# Patient Record
Sex: FEMALE | Race: WHITE | Employment: FULL TIME | URBAN - METROPOLITAN AREA
[De-identification: names, ages, dates, MRNs, and addresses within clinical notes are randomized per-mention and may not be internally consistent; named-entity substitution may affect disease eponyms.]

---

## 2017-01-03 ENCOUNTER — HOSPITAL ENCOUNTER (OUTPATIENT)
Dept: MRI IMAGING | Age: 57
Discharge: HOME | End: 2017-01-03
Payer: COMMERCIAL

## 2017-01-03 DIAGNOSIS — S83.91XA SPRAIN OF RIGHT KNEE: ICD-10-CM

## 2017-01-03 PROCEDURE — 73721 MRI JNT OF LWR EXTRE W/O DYE: CPT

## 2017-02-28 ENCOUNTER — HOSPITAL ENCOUNTER (OUTPATIENT)
Dept: NON INVASIVE DIAGNOSTICS | Age: 57
Discharge: HOME | End: 2017-02-28
Payer: COMMERCIAL

## 2017-02-28 DIAGNOSIS — Z01.818 PRE-OP EXAM: ICD-10-CM

## 2017-02-28 LAB
ATRIAL RATE: 78 BPM
CALCULATED P AXIS, ECG09: -2 DEGREES
CALCULATED R AXIS, ECG10: 39 DEGREES
CALCULATED T AXIS, ECG11: 36 DEGREES
DIAGNOSIS, 93000: NORMAL
P-R INTERVAL, ECG05: 158 MS
Q-T INTERVAL, ECG07: 378 MS
QRS DURATION, ECG06: 88 MS
QTC CALCULATION (BEZET), ECG08: 430 MS
VENTRICULAR RATE, ECG03: 78 BPM

## 2017-02-28 PROCEDURE — 93005 ELECTROCARDIOGRAM TRACING: CPT

## 2017-10-16 ENCOUNTER — HOSPITAL ENCOUNTER (OUTPATIENT)
Dept: PREADMISSION TESTING | Age: 57
Discharge: HOME | End: 2017-10-16

## 2017-10-16 ENCOUNTER — HOSPITAL ENCOUNTER (OUTPATIENT)
Dept: PHYSICAL THERAPY | Age: 57
Discharge: HOME | End: 2017-10-16

## 2017-10-16 VITALS
TEMPERATURE: 97.9 F | SYSTOLIC BLOOD PRESSURE: 138 MMHG | DIASTOLIC BLOOD PRESSURE: 72 MMHG | HEART RATE: 84 BPM | BODY MASS INDEX: 39.76 KG/M2 | HEIGHT: 66 IN | OXYGEN SATURATION: 98 % | RESPIRATION RATE: 18 BRPM | WEIGHT: 247.4 LBS

## 2017-10-16 RX ORDER — ROSUVASTATIN CALCIUM 20 MG/1
20 TABLET, COATED ORAL
COMMUNITY
End: 2022-03-17

## 2017-10-16 RX ORDER — LOSARTAN POTASSIUM 50 MG/1
50 TABLET ORAL
COMMUNITY
End: 2022-03-17

## 2017-10-16 NOTE — ADVANCED PRACTICE NURSE
BMI of 40 in PAT assessment. PC to [facility] at Dr. Hipolito Rivera' office who advises to no proceed with PAT assessment due to BMI. States pt was due to come in for weight check last week but did not show for appt. Pt advised per [facility] that we were unable to proceed with appt due to BMI. Advised pt to contact Dr. Hipolito Rivera' office regarding further instructions for surgery. Pt states she will do so.

## 2017-11-13 ENCOUNTER — HOSPITAL ENCOUNTER (OUTPATIENT)
Dept: PREADMISSION TESTING | Age: 57
Discharge: HOME | End: 2017-11-13
Payer: COMMERCIAL

## 2017-11-13 VITALS
TEMPERATURE: 97.9 F | HEIGHT: 66 IN | WEIGHT: 248 LBS | BODY MASS INDEX: 39.86 KG/M2 | HEART RATE: 87 BPM | SYSTOLIC BLOOD PRESSURE: 160 MMHG | DIASTOLIC BLOOD PRESSURE: 89 MMHG

## 2017-11-13 LAB
ABO + RH BLD: NORMAL
ANION GAP SERPL CALC-SCNC: 6 MMOL/L (ref 5–15)
APPEARANCE UR: ABNORMAL
ATRIAL RATE: 75 BPM
BACTERIA URNS QL MICRO: NEGATIVE /HPF
BILIRUB UR QL: NEGATIVE
BLOOD GROUP ANTIBODIES SERPL: NORMAL
BUN SERPL-MCNC: 13 MG/DL (ref 6–20)
BUN/CREAT SERPL: 21 (ref 12–20)
CALCIUM SERPL-MCNC: 9.2 MG/DL (ref 8.5–10.1)
CALCULATED P AXIS, ECG09: -12 DEGREES
CALCULATED R AXIS, ECG10: 28 DEGREES
CALCULATED T AXIS, ECG11: 26 DEGREES
CHLORIDE SERPL-SCNC: 103 MMOL/L (ref 97–108)
CO2 SERPL-SCNC: 31 MMOL/L (ref 21–32)
COLOR UR: ABNORMAL
CREAT SERPL-MCNC: 0.63 MG/DL (ref 0.55–1.02)
DIAGNOSIS, 93000: NORMAL
EPITH CASTS URNS QL MICRO: ABNORMAL /LPF
ERYTHROCYTE [DISTWIDTH] IN BLOOD BY AUTOMATED COUNT: 13.4 % (ref 11.5–14.5)
EST. AVERAGE GLUCOSE BLD GHB EST-MCNC: 97 MG/DL
GLUCOSE SERPL-MCNC: 97 MG/DL (ref 65–100)
GLUCOSE UR STRIP.AUTO-MCNC: NEGATIVE MG/DL
HBA1C MFR BLD: 5 % (ref 4.2–6.3)
HCT VFR BLD AUTO: 38.6 % (ref 35–47)
HGB BLD-MCNC: 12.7 G/DL (ref 11.5–16)
HGB UR QL STRIP: NEGATIVE
HYALINE CASTS URNS QL MICRO: ABNORMAL /LPF (ref 0–5)
INR PPP: 1 (ref 0.9–1.1)
KETONES UR QL STRIP.AUTO: NEGATIVE MG/DL
LEUKOCYTE ESTERASE UR QL STRIP.AUTO: ABNORMAL
MCH RBC QN AUTO: 28.1 PG (ref 26–34)
MCHC RBC AUTO-ENTMCNC: 32.9 G/DL (ref 30–36.5)
MCV RBC AUTO: 85.4 FL (ref 80–99)
NITRITE UR QL STRIP.AUTO: NEGATIVE
P-R INTERVAL, ECG05: 168 MS
PH UR STRIP: 6 [PH] (ref 5–8)
PLATELET # BLD AUTO: 154 K/UL (ref 150–400)
POTASSIUM SERPL-SCNC: 4.1 MMOL/L (ref 3.5–5.1)
PROT UR STRIP-MCNC: NEGATIVE MG/DL
PROTHROMBIN TIME: 10.4 SEC (ref 9–11.1)
Q-T INTERVAL, ECG07: 380 MS
QRS DURATION, ECG06: 90 MS
QTC CALCULATION (BEZET), ECG08: 424 MS
RBC # BLD AUTO: 4.52 M/UL (ref 3.8–5.2)
RBC #/AREA URNS HPF: ABNORMAL /HPF (ref 0–5)
SODIUM SERPL-SCNC: 140 MMOL/L (ref 136–145)
SP GR UR REFRACTOMETRY: 1.02 (ref 1–1.03)
SPECIMEN EXP DATE BLD: NORMAL
UA: UC IF INDICATED,UAUC: ABNORMAL
UROBILINOGEN UR QL STRIP.AUTO: 0.2 EU/DL (ref 0.2–1)
VENTRICULAR RATE, ECG03: 75 BPM
WBC # BLD AUTO: 4 K/UL (ref 3.6–11)
WBC URNS QL MICRO: ABNORMAL /HPF (ref 0–4)

## 2017-11-13 PROCEDURE — 83036 HEMOGLOBIN GLYCOSYLATED A1C: CPT | Performed by: ORTHOPAEDIC SURGERY

## 2017-11-13 PROCEDURE — 81001 URINALYSIS AUTO W/SCOPE: CPT | Performed by: ORTHOPAEDIC SURGERY

## 2017-11-13 PROCEDURE — 87086 URINE CULTURE/COLONY COUNT: CPT | Performed by: ORTHOPAEDIC SURGERY

## 2017-11-13 PROCEDURE — 85610 PROTHROMBIN TIME: CPT | Performed by: ORTHOPAEDIC SURGERY

## 2017-11-13 PROCEDURE — 80048 BASIC METABOLIC PNL TOTAL CA: CPT | Performed by: ORTHOPAEDIC SURGERY

## 2017-11-13 PROCEDURE — 86900 BLOOD TYPING SEROLOGIC ABO: CPT | Performed by: ORTHOPAEDIC SURGERY

## 2017-11-13 PROCEDURE — 93005 ELECTROCARDIOGRAM TRACING: CPT

## 2017-11-13 PROCEDURE — 85027 COMPLETE CBC AUTOMATED: CPT | Performed by: ORTHOPAEDIC SURGERY

## 2017-11-13 RX ORDER — ACETAMINOPHEN 500 MG
1000 TABLET ORAL
COMMUNITY
End: 2017-11-22

## 2017-11-13 RX ORDER — IBUPROFEN 200 MG
400 TABLET ORAL AS NEEDED
COMMUNITY
End: 2017-11-22

## 2017-11-13 NOTE — PERIOP NOTES
PREOPERATIVE INSTRUCTIONS REVIEWED WITH PATIENT. PATIENT GIVEN SIX PACKS OF CHG WIPES. INSTRUCTIONS (TO BE REVIEWED IN CLASS) ON USE OF CHG WIPES. PATIENT GIVEN SSI INFECTION SHEET. MRSA/MMSA TREATMENT INSTRUCTION SHEET GIVEN WITH AN EXPLANATION TO PATIENT THAT THEY WILL BE NOTIFIED IF TREATMENT INSTRUCTIONS NEED TO BE INITIATED. PATIENT WAS GIVEN THE OPPORTUNITY TO ASK QUESTIONS ON THE INFORMATION PROVIDED.

## 2017-11-14 LAB
BACTERIA SPEC CULT: NORMAL
CC UR VC: NORMAL
SERVICE CMNT-IMP: NORMAL
SERVICE CMNT-IMP: NORMAL

## 2017-11-17 RX ORDER — ACETAMINOPHEN 500 MG
1000 TABLET ORAL ONCE
Status: CANCELLED | OUTPATIENT
Start: 2017-11-17 | End: 2017-11-17

## 2017-11-17 RX ORDER — CEFAZOLIN SODIUM/WATER 2 G/20 ML
2 SYRINGE (ML) INTRAVENOUS ONCE
Status: CANCELLED | OUTPATIENT
Start: 2017-11-17 | End: 2017-11-17

## 2017-11-17 RX ORDER — PREGABALIN 75 MG/1
75 CAPSULE ORAL ONCE
Status: CANCELLED | OUTPATIENT
Start: 2017-11-17 | End: 2017-11-17

## 2017-11-17 RX ORDER — CELECOXIB 200 MG/1
200 CAPSULE ORAL ONCE
Status: CANCELLED | OUTPATIENT
Start: 2017-11-17 | End: 2017-11-17

## 2017-11-17 RX ORDER — DEXAMETHASONE SODIUM PHOSPHATE 100 MG/10ML
10 INJECTION INTRAMUSCULAR; INTRAVENOUS ONCE
Status: CANCELLED | OUTPATIENT
Start: 2017-11-17 | End: 2017-11-17

## 2017-11-20 ENCOUNTER — ANESTHESIA EVENT (OUTPATIENT)
Dept: SURGERY | Age: 57
DRG: 470 | End: 2017-11-20
Payer: COMMERCIAL

## 2017-11-21 ENCOUNTER — HOSPITAL ENCOUNTER (INPATIENT)
Age: 57
LOS: 1 days | Discharge: HOME HEALTH | DRG: 470 | End: 2017-11-22
Attending: ORTHOPAEDIC SURGERY | Admitting: ORTHOPAEDIC SURGERY
Payer: COMMERCIAL

## 2017-11-21 ENCOUNTER — ANESTHESIA (OUTPATIENT)
Dept: SURGERY | Age: 57
DRG: 470 | End: 2017-11-21
Payer: COMMERCIAL

## 2017-11-21 PROBLEM — M17.11 PRIMARY LOCALIZED OSTEOARTHRITIS OF RIGHT KNEE: Status: ACTIVE | Noted: 2017-11-21

## 2017-11-21 LAB
GLUCOSE BLD STRIP.AUTO-MCNC: 93 MG/DL (ref 65–100)
SERVICE CMNT-IMP: NORMAL

## 2017-11-21 PROCEDURE — C1713 ANCHOR/SCREW BN/BN,TIS/BN: HCPCS | Performed by: ORTHOPAEDIC SURGERY

## 2017-11-21 PROCEDURE — 65270000029 HC RM PRIVATE

## 2017-11-21 PROCEDURE — 77030033067 HC SUT PDO STRATFX SPIR J&J -B: Performed by: ORTHOPAEDIC SURGERY

## 2017-11-21 PROCEDURE — 77030003601 HC NDL NRV BLK BBMI -A

## 2017-11-21 PROCEDURE — 76210000016 HC OR PH I REC 1 TO 1.5 HR: Performed by: ORTHOPAEDIC SURGERY

## 2017-11-21 PROCEDURE — C1776 JOINT DEVICE (IMPLANTABLE): HCPCS | Performed by: ORTHOPAEDIC SURGERY

## 2017-11-21 PROCEDURE — 74011250636 HC RX REV CODE- 250/636

## 2017-11-21 PROCEDURE — 74011250636 HC RX REV CODE- 250/636: Performed by: ANESTHESIOLOGY

## 2017-11-21 PROCEDURE — 74011250636 HC RX REV CODE- 250/636: Performed by: ORTHOPAEDIC SURGERY

## 2017-11-21 PROCEDURE — 0SRC0J9 REPLACEMENT OF RIGHT KNEE JOINT WITH SYNTHETIC SUBSTITUTE, CEMENTED, OPEN APPROACH: ICD-10-PCS | Performed by: ORTHOPAEDIC SURGERY

## 2017-11-21 PROCEDURE — 77030018836 HC SOL IRR NACL ICUM -A: Performed by: ORTHOPAEDIC SURGERY

## 2017-11-21 PROCEDURE — 77030005515 HC CATH URETH FOL14 BARD -B: Performed by: ORTHOPAEDIC SURGERY

## 2017-11-21 PROCEDURE — 77030012935 HC DRSG AQUACEL BMS -B: Performed by: ORTHOPAEDIC SURGERY

## 2017-11-21 PROCEDURE — 77030011640 HC PAD GRND REM COVD -A: Performed by: ORTHOPAEDIC SURGERY

## 2017-11-21 PROCEDURE — 77030000032 HC CUF TRNQT ZIMM -B: Performed by: ORTHOPAEDIC SURGERY

## 2017-11-21 PROCEDURE — 74011000250 HC RX REV CODE- 250: Performed by: ORTHOPAEDIC SURGERY

## 2017-11-21 PROCEDURE — 76010000172 HC OR TIME 2.5 TO 3 HR INTENSV-TIER 1: Performed by: ORTHOPAEDIC SURGERY

## 2017-11-21 PROCEDURE — 77030020788: Performed by: ORTHOPAEDIC SURGERY

## 2017-11-21 PROCEDURE — 82962 GLUCOSE BLOOD TEST: CPT

## 2017-11-21 PROCEDURE — 3E0T3BZ INTRODUCTION OF ANESTHETIC AGENT INTO PERIPHERAL NERVES AND PLEXI, PERCUTANEOUS APPROACH: ICD-10-PCS | Performed by: ANESTHESIOLOGY

## 2017-11-21 PROCEDURE — 74011000250 HC RX REV CODE- 250

## 2017-11-21 PROCEDURE — 74011250637 HC RX REV CODE- 250/637: Performed by: ORTHOPAEDIC SURGERY

## 2017-11-21 PROCEDURE — 77030007866 HC KT SPN ANES BBMI -B: Performed by: ANESTHESIOLOGY

## 2017-11-21 PROCEDURE — 77030014077 HC TOWER MX CEM J&J -C: Performed by: ORTHOPAEDIC SURGERY

## 2017-11-21 PROCEDURE — C9290 INJ, BUPIVACAINE LIPOSOME: HCPCS | Performed by: ORTHOPAEDIC SURGERY

## 2017-11-21 PROCEDURE — 74011000258 HC RX REV CODE- 258: Performed by: ORTHOPAEDIC SURGERY

## 2017-11-21 PROCEDURE — 77030027138 HC INCENT SPIROMETER -A

## 2017-11-21 PROCEDURE — 74011000258 HC RX REV CODE- 258

## 2017-11-21 PROCEDURE — 77030018846 HC SOL IRR STRL H20 ICUM -A: Performed by: ORTHOPAEDIC SURGERY

## 2017-11-21 PROCEDURE — 77030016547 HC BLD SAW SAG1 STRY -B: Performed by: ORTHOPAEDIC SURGERY

## 2017-11-21 PROCEDURE — 77030020782 HC GWN BAIR PAWS FLX 3M -B

## 2017-11-21 PROCEDURE — 77030002933 HC SUT MCRYL J&J -A: Performed by: ORTHOPAEDIC SURGERY

## 2017-11-21 PROCEDURE — 77030031139 HC SUT VCRL2 J&J -A: Performed by: ORTHOPAEDIC SURGERY

## 2017-11-21 PROCEDURE — 77030010507 HC ADH SKN DERMBND J&J -B: Performed by: ORTHOPAEDIC SURGERY

## 2017-11-21 PROCEDURE — 76060000036 HC ANESTHESIA 2.5 TO 3 HR: Performed by: ORTHOPAEDIC SURGERY

## 2017-11-21 PROCEDURE — 77030018822 HC SLV COMPR FT COVD -B

## 2017-11-21 PROCEDURE — 64450 NJX AA&/STRD OTHER PN/BRANCH: CPT

## 2017-11-21 PROCEDURE — 77030020365 HC SOL INJ SOD CL 0.9% 50ML: Performed by: ORTHOPAEDIC SURGERY

## 2017-11-21 DEVICE — KIT BNE CEM PREP PLUG BRSH CURET SPNG W/ RESTRIC FOR FEM: Type: IMPLANTABLE DEVICE | Site: KNEE | Status: FUNCTIONAL

## 2017-11-21 DEVICE — COMPONENT FEM SZ 7 R KNEE POST STBL CEM ATTUNE: Type: IMPLANTABLE DEVICE | Site: KNEE | Status: FUNCTIONAL

## 2017-11-21 DEVICE — INSERT TIB SZ 7 THK7MM KNEE POST STBL FIX BEAR ATTUNE: Type: IMPLANTABLE DEVICE | Site: KNEE | Status: FUNCTIONAL

## 2017-11-21 DEVICE — COMPONENT PAT DIA38MM POLYETH DOME CEM MEDIALIZED ATTUNE: Type: IMPLANTABLE DEVICE | Site: KNEE | Status: FUNCTIONAL

## 2017-11-21 DEVICE — CEMENT BNE GENTAMICIN 40 GM SMARTSET GMV: Type: IMPLANTABLE DEVICE | Site: KNEE | Status: FUNCTIONAL

## 2017-11-21 RX ORDER — MIDAZOLAM HYDROCHLORIDE 1 MG/ML
1 INJECTION, SOLUTION INTRAMUSCULAR; INTRAVENOUS AS NEEDED
Status: DISCONTINUED | OUTPATIENT
Start: 2017-11-21 | End: 2017-11-21 | Stop reason: HOSPADM

## 2017-11-21 RX ORDER — PAROXETINE HYDROCHLORIDE 20 MG/1
20 TABLET, FILM COATED ORAL
Status: DISCONTINUED | OUTPATIENT
Start: 2017-11-21 | End: 2017-11-22 | Stop reason: HOSPADM

## 2017-11-21 RX ORDER — ACETAMINOPHEN 500 MG
500 TABLET ORAL
Status: DISCONTINUED | OUTPATIENT
Start: 2017-11-21 | End: 2017-11-22 | Stop reason: HOSPADM

## 2017-11-21 RX ORDER — FACIAL-BODY WIPES
10 EACH TOPICAL DAILY PRN
Status: DISCONTINUED | OUTPATIENT
Start: 2017-11-23 | End: 2017-11-22 | Stop reason: HOSPADM

## 2017-11-21 RX ORDER — SODIUM CHLORIDE 9 MG/ML
25 INJECTION, SOLUTION INTRAVENOUS CONTINUOUS
Status: DISCONTINUED | OUTPATIENT
Start: 2017-11-21 | End: 2017-11-21 | Stop reason: HOSPADM

## 2017-11-21 RX ORDER — HYDROMORPHONE HYDROCHLORIDE 2 MG/ML
0.5 INJECTION, SOLUTION INTRAMUSCULAR; INTRAVENOUS; SUBCUTANEOUS
Status: ACTIVE | OUTPATIENT
Start: 2017-11-21 | End: 2017-11-22

## 2017-11-21 RX ORDER — BUPIVACAINE HYDROCHLORIDE 7.5 MG/ML
INJECTION, SOLUTION EPIDURAL; RETROBULBAR AS NEEDED
Status: DISCONTINUED | OUTPATIENT
Start: 2017-11-21 | End: 2017-11-21 | Stop reason: HOSPADM

## 2017-11-21 RX ORDER — PREGABALIN 75 MG/1
75 CAPSULE ORAL ONCE
Status: COMPLETED | OUTPATIENT
Start: 2017-11-21 | End: 2017-11-21

## 2017-11-21 RX ORDER — HYDROMORPHONE HYDROCHLORIDE 1 MG/ML
0.2 INJECTION, SOLUTION INTRAMUSCULAR; INTRAVENOUS; SUBCUTANEOUS
Status: DISCONTINUED | OUTPATIENT
Start: 2017-11-21 | End: 2017-11-21 | Stop reason: HOSPADM

## 2017-11-21 RX ORDER — WARFARIN 7.5 MG/1
7.5 TABLET ORAL ONCE
Status: COMPLETED | OUTPATIENT
Start: 2017-11-21 | End: 2017-11-21

## 2017-11-21 RX ORDER — PROPOFOL 10 MG/ML
INJECTION, EMULSION INTRAVENOUS
Status: DISCONTINUED | OUTPATIENT
Start: 2017-11-21 | End: 2017-11-21 | Stop reason: HOSPADM

## 2017-11-21 RX ORDER — FENTANYL CITRATE 50 UG/ML
50 INJECTION, SOLUTION INTRAMUSCULAR; INTRAVENOUS AS NEEDED
Status: DISCONTINUED | OUTPATIENT
Start: 2017-11-21 | End: 2017-11-21 | Stop reason: HOSPADM

## 2017-11-21 RX ORDER — FENTANYL CITRATE 50 UG/ML
25 INJECTION, SOLUTION INTRAMUSCULAR; INTRAVENOUS
Status: DISCONTINUED | OUTPATIENT
Start: 2017-11-21 | End: 2017-11-21 | Stop reason: HOSPADM

## 2017-11-21 RX ORDER — HYDROXYZINE HYDROCHLORIDE 10 MG/1
10 TABLET, FILM COATED ORAL
Status: DISCONTINUED | OUTPATIENT
Start: 2017-11-21 | End: 2017-11-22 | Stop reason: HOSPADM

## 2017-11-21 RX ORDER — MIDAZOLAM HYDROCHLORIDE 1 MG/ML
0.5 INJECTION, SOLUTION INTRAMUSCULAR; INTRAVENOUS
Status: DISCONTINUED | OUTPATIENT
Start: 2017-11-21 | End: 2017-11-21 | Stop reason: HOSPADM

## 2017-11-21 RX ORDER — DEXAMETHASONE SODIUM PHOSPHATE 4 MG/ML
INJECTION, SOLUTION INTRA-ARTICULAR; INTRALESIONAL; INTRAMUSCULAR; INTRAVENOUS; SOFT TISSUE AS NEEDED
Status: DISCONTINUED | OUTPATIENT
Start: 2017-11-21 | End: 2017-11-21 | Stop reason: HOSPADM

## 2017-11-21 RX ORDER — ONDANSETRON 2 MG/ML
4 INJECTION INTRAMUSCULAR; INTRAVENOUS AS NEEDED
Status: DISCONTINUED | OUTPATIENT
Start: 2017-11-21 | End: 2017-11-21 | Stop reason: HOSPADM

## 2017-11-21 RX ORDER — HYDROCHLOROTHIAZIDE 25 MG/1
12.5 TABLET ORAL
Status: DISCONTINUED | OUTPATIENT
Start: 2017-11-22 | End: 2017-11-22 | Stop reason: HOSPADM

## 2017-11-21 RX ORDER — ROPIVACAINE HYDROCHLORIDE 5 MG/ML
30 INJECTION, SOLUTION EPIDURAL; INFILTRATION; PERINEURAL AS NEEDED
Status: DISCONTINUED | OUTPATIENT
Start: 2017-11-21 | End: 2017-11-21 | Stop reason: HOSPADM

## 2017-11-21 RX ORDER — MORPHINE SULFATE 10 MG/ML
2 INJECTION, SOLUTION INTRAMUSCULAR; INTRAVENOUS
Status: DISCONTINUED | OUTPATIENT
Start: 2017-11-21 | End: 2017-11-21 | Stop reason: HOSPADM

## 2017-11-21 RX ORDER — SODIUM CHLORIDE, SODIUM LACTATE, POTASSIUM CHLORIDE, CALCIUM CHLORIDE 600; 310; 30; 20 MG/100ML; MG/100ML; MG/100ML; MG/100ML
1000 INJECTION, SOLUTION INTRAVENOUS CONTINUOUS
Status: DISCONTINUED | OUTPATIENT
Start: 2017-11-21 | End: 2017-11-21 | Stop reason: HOSPADM

## 2017-11-21 RX ORDER — HYDROCHLOROTHIAZIDE 25 MG/1
12.5 TABLET ORAL
Status: DISCONTINUED | OUTPATIENT
Start: 2017-11-21 | End: 2017-11-21

## 2017-11-21 RX ORDER — CEFAZOLIN SODIUM/WATER 2 G/20 ML
2 SYRINGE (ML) INTRAVENOUS EVERY 8 HOURS
Status: COMPLETED | OUTPATIENT
Start: 2017-11-21 | End: 2017-11-22

## 2017-11-21 RX ORDER — ROSUVASTATIN CALCIUM 10 MG/1
20 TABLET, COATED ORAL
Status: DISCONTINUED | OUTPATIENT
Start: 2017-11-22 | End: 2017-11-22 | Stop reason: HOSPADM

## 2017-11-21 RX ORDER — POLYETHYLENE GLYCOL 3350 17 G/17G
17 POWDER, FOR SOLUTION ORAL DAILY
Status: DISCONTINUED | OUTPATIENT
Start: 2017-11-22 | End: 2017-11-22 | Stop reason: HOSPADM

## 2017-11-21 RX ORDER — GLYCOPYRROLATE 0.2 MG/ML
0.2 INJECTION INTRAMUSCULAR; INTRAVENOUS
Status: DISCONTINUED | OUTPATIENT
Start: 2017-11-21 | End: 2017-11-21 | Stop reason: HOSPADM

## 2017-11-21 RX ORDER — MIDAZOLAM HYDROCHLORIDE 1 MG/ML
INJECTION, SOLUTION INTRAMUSCULAR; INTRAVENOUS AS NEEDED
Status: DISCONTINUED | OUTPATIENT
Start: 2017-11-21 | End: 2017-11-21 | Stop reason: HOSPADM

## 2017-11-21 RX ORDER — SODIUM CHLORIDE 0.9 % (FLUSH) 0.9 %
5-10 SYRINGE (ML) INJECTION EVERY 8 HOURS
Status: DISCONTINUED | OUTPATIENT
Start: 2017-11-22 | End: 2017-11-22 | Stop reason: HOSPADM

## 2017-11-21 RX ORDER — KETAMINE HYDROCHLORIDE 10 MG/ML
INJECTION, SOLUTION INTRAMUSCULAR; INTRAVENOUS AS NEEDED
Status: DISCONTINUED | OUTPATIENT
Start: 2017-11-21 | End: 2017-11-21 | Stop reason: HOSPADM

## 2017-11-21 RX ORDER — DIPHENHYDRAMINE HYDROCHLORIDE 50 MG/ML
12.5 INJECTION, SOLUTION INTRAMUSCULAR; INTRAVENOUS AS NEEDED
Status: DISCONTINUED | OUTPATIENT
Start: 2017-11-21 | End: 2017-11-21 | Stop reason: HOSPADM

## 2017-11-21 RX ORDER — KETOROLAC TROMETHAMINE 30 MG/ML
30 INJECTION, SOLUTION INTRAMUSCULAR; INTRAVENOUS EVERY 6 HOURS
Status: COMPLETED | OUTPATIENT
Start: 2017-11-21 | End: 2017-11-22

## 2017-11-21 RX ORDER — OXYCODONE HYDROCHLORIDE 5 MG/1
5 TABLET ORAL
Status: DISCONTINUED | OUTPATIENT
Start: 2017-11-21 | End: 2017-11-22 | Stop reason: HOSPADM

## 2017-11-21 RX ORDER — SODIUM CHLORIDE 0.9 % (FLUSH) 0.9 %
5-10 SYRINGE (ML) INJECTION AS NEEDED
Status: DISCONTINUED | OUTPATIENT
Start: 2017-11-21 | End: 2017-11-22 | Stop reason: HOSPADM

## 2017-11-21 RX ORDER — LOSARTAN POTASSIUM 50 MG/1
50 TABLET ORAL
Status: DISCONTINUED | OUTPATIENT
Start: 2017-11-21 | End: 2017-11-21

## 2017-11-21 RX ORDER — DEXAMETHASONE SODIUM PHOSPHATE 100 MG/10ML
10 INJECTION INTRAMUSCULAR; INTRAVENOUS ONCE
Status: DISCONTINUED | OUTPATIENT
Start: 2017-11-21 | End: 2017-11-21 | Stop reason: HOSPADM

## 2017-11-21 RX ORDER — NALOXONE HYDROCHLORIDE 0.4 MG/ML
0.4 INJECTION, SOLUTION INTRAMUSCULAR; INTRAVENOUS; SUBCUTANEOUS AS NEEDED
Status: DISCONTINUED | OUTPATIENT
Start: 2017-11-21 | End: 2017-11-22 | Stop reason: HOSPADM

## 2017-11-21 RX ORDER — OXYCODONE HYDROCHLORIDE 5 MG/1
10 TABLET ORAL
Status: DISCONTINUED | OUTPATIENT
Start: 2017-11-21 | End: 2017-11-22 | Stop reason: HOSPADM

## 2017-11-21 RX ORDER — ACETAMINOPHEN 500 MG
1000 TABLET ORAL ONCE
Status: COMPLETED | OUTPATIENT
Start: 2017-11-21 | End: 2017-11-21

## 2017-11-21 RX ORDER — ONDANSETRON 2 MG/ML
4 INJECTION INTRAMUSCULAR; INTRAVENOUS
Status: DISPENSED | OUTPATIENT
Start: 2017-11-21 | End: 2017-11-22

## 2017-11-21 RX ORDER — SODIUM CHLORIDE 9 MG/ML
125 INJECTION, SOLUTION INTRAVENOUS CONTINUOUS
Status: DISPENSED | OUTPATIENT
Start: 2017-11-21 | End: 2017-11-22

## 2017-11-21 RX ORDER — LOSARTAN POTASSIUM 50 MG/1
50 TABLET ORAL
Status: DISCONTINUED | OUTPATIENT
Start: 2017-11-22 | End: 2017-11-22 | Stop reason: HOSPADM

## 2017-11-21 RX ORDER — DEXAMETHASONE SODIUM PHOSPHATE 10 MG/ML
10 INJECTION INTRAMUSCULAR; INTRAVENOUS ONCE
Status: DISCONTINUED | OUTPATIENT
Start: 2017-11-22 | End: 2017-11-22 | Stop reason: HOSPADM

## 2017-11-21 RX ORDER — CEFAZOLIN SODIUM/WATER 2 G/20 ML
2 SYRINGE (ML) INTRAVENOUS
Status: COMPLETED | OUTPATIENT
Start: 2017-11-21 | End: 2017-11-21

## 2017-11-21 RX ORDER — SODIUM CHLORIDE, SODIUM LACTATE, POTASSIUM CHLORIDE, CALCIUM CHLORIDE 600; 310; 30; 20 MG/100ML; MG/100ML; MG/100ML; MG/100ML
INJECTION, SOLUTION INTRAVENOUS
Status: DISCONTINUED | OUTPATIENT
Start: 2017-11-21 | End: 2017-11-21 | Stop reason: HOSPADM

## 2017-11-21 RX ORDER — ROSUVASTATIN CALCIUM 10 MG/1
20 TABLET, COATED ORAL
Status: DISCONTINUED | OUTPATIENT
Start: 2017-11-21 | End: 2017-11-21

## 2017-11-21 RX ORDER — HYDROCODONE BITARTRATE AND ACETAMINOPHEN 5; 325 MG/1; MG/1
1 TABLET ORAL AS NEEDED
Status: DISCONTINUED | OUTPATIENT
Start: 2017-11-21 | End: 2017-11-21 | Stop reason: HOSPADM

## 2017-11-21 RX ORDER — ALBUTEROL SULFATE 0.83 MG/ML
2.5 SOLUTION RESPIRATORY (INHALATION) AS NEEDED
Status: DISCONTINUED | OUTPATIENT
Start: 2017-11-21 | End: 2017-11-21 | Stop reason: HOSPADM

## 2017-11-21 RX ORDER — AMOXICILLIN 250 MG
1 CAPSULE ORAL 2 TIMES DAILY
Status: DISCONTINUED | OUTPATIENT
Start: 2017-11-21 | End: 2017-11-22 | Stop reason: HOSPADM

## 2017-11-21 RX ORDER — CELECOXIB 200 MG/1
200 CAPSULE ORAL ONCE
Status: COMPLETED | OUTPATIENT
Start: 2017-11-21 | End: 2017-11-21

## 2017-11-21 RX ORDER — LIDOCAINE HYDROCHLORIDE 10 MG/ML
0.1 INJECTION, SOLUTION EPIDURAL; INFILTRATION; INTRACAUDAL; PERINEURAL AS NEEDED
Status: DISCONTINUED | OUTPATIENT
Start: 2017-11-21 | End: 2017-11-21 | Stop reason: HOSPADM

## 2017-11-21 RX ADMIN — KETAMINE HYDROCHLORIDE 15 MG: 10 INJECTION, SOLUTION INTRAMUSCULAR; INTRAVENOUS at 13:10

## 2017-11-21 RX ADMIN — KETOROLAC TROMETHAMINE 30 MG: 30 INJECTION, SOLUTION INTRAMUSCULAR at 21:39

## 2017-11-21 RX ADMIN — PREGABALIN 75 MG: 75 CAPSULE ORAL at 12:00

## 2017-11-21 RX ADMIN — MIDAZOLAM HYDROCHLORIDE 2 MG: 1 INJECTION, SOLUTION INTRAMUSCULAR; INTRAVENOUS at 12:47

## 2017-11-21 RX ADMIN — Medication 2 G: at 20:06

## 2017-11-21 RX ADMIN — ACETAMINOPHEN 1000 MG: 500 TABLET ORAL at 12:00

## 2017-11-21 RX ADMIN — CELECOXIB 200 MG: 200 CAPSULE ORAL at 12:00

## 2017-11-21 RX ADMIN — BUPIVACAINE HYDROCHLORIDE 12 MG: 7.5 INJECTION, SOLUTION EPIDURAL; RETROBULBAR at 13:00

## 2017-11-21 RX ADMIN — MIDAZOLAM HYDROCHLORIDE 1 MG: 1 INJECTION, SOLUTION INTRAMUSCULAR; INTRAVENOUS at 13:00

## 2017-11-21 RX ADMIN — DOCUSATE SODIUM AND SENNOSIDES 1 TABLET: 8.6; 5 TABLET, FILM COATED ORAL at 19:04

## 2017-11-21 RX ADMIN — OXYCODONE HYDROCHLORIDE 5 MG: 5 TABLET ORAL at 20:06

## 2017-11-21 RX ADMIN — SODIUM CHLORIDE, SODIUM LACTATE, POTASSIUM CHLORIDE, CALCIUM CHLORIDE: 600; 310; 30; 20 INJECTION, SOLUTION INTRAVENOUS at 14:00

## 2017-11-21 RX ADMIN — SODIUM CHLORIDE, SODIUM LACTATE, POTASSIUM CHLORIDE, CALCIUM CHLORIDE: 600; 310; 30; 20 INJECTION, SOLUTION INTRAVENOUS at 13:05

## 2017-11-21 RX ADMIN — SODIUM CHLORIDE 125 ML/HR: 900 INJECTION, SOLUTION INTRAVENOUS at 21:38

## 2017-11-21 RX ADMIN — MEPERIDINE HYDROCHLORIDE 12.5 MG: 25 INJECTION INTRAMUSCULAR; INTRAVENOUS; SUBCUTANEOUS at 15:40

## 2017-11-21 RX ADMIN — MIDAZOLAM HYDROCHLORIDE 2 MG: 1 INJECTION, SOLUTION INTRAMUSCULAR; INTRAVENOUS at 12:50

## 2017-11-21 RX ADMIN — ACETAMINOPHEN 500 MG: 500 TABLET, FILM COATED ORAL at 23:46

## 2017-11-21 RX ADMIN — DEXAMETHASONE SODIUM PHOSPHATE 10 MG: 4 INJECTION, SOLUTION INTRA-ARTICULAR; INTRALESIONAL; INTRAMUSCULAR; INTRAVENOUS; SOFT TISSUE at 13:16

## 2017-11-21 RX ADMIN — PROPOFOL 50 MCG/KG/MIN: 10 INJECTION, EMULSION INTRAVENOUS at 13:15

## 2017-11-21 RX ADMIN — FENTANYL CITRATE 100 MCG: 50 INJECTION, SOLUTION INTRAMUSCULAR; INTRAVENOUS at 12:47

## 2017-11-21 RX ADMIN — ACETAMINOPHEN 500 MG: 500 TABLET, FILM COATED ORAL at 19:04

## 2017-11-21 RX ADMIN — OXYCODONE HYDROCHLORIDE 5 MG: 5 TABLET ORAL at 23:46

## 2017-11-21 RX ADMIN — WARFARIN SODIUM 7.5 MG: 7.5 TABLET ORAL at 19:04

## 2017-11-21 RX ADMIN — Medication 2 G: at 13:10

## 2017-11-21 RX ADMIN — PAROXETINE HYDROCHLORIDE 20 MG: 20 TABLET, FILM COATED ORAL at 19:04

## 2017-11-21 RX ADMIN — MIDAZOLAM HYDROCHLORIDE 2 MG: 1 INJECTION, SOLUTION INTRAMUSCULAR; INTRAVENOUS at 13:05

## 2017-11-21 NOTE — IP AVS SNAPSHOT
[address] 
[phone] Patient: My Davey MRN: [id number]:[id number] About your hospitalization You were admitted on:  November 21, 2017 You last received care in the:  [address] [place] [address] You were discharged on:  November 22, 2017 Why you were hospitalized Your primary diagnosis was:  Not on File Your diagnoses also included:  Primary Localized Osteoarthritis Of Right Knee Things You Need To Do (next 8 weeks) Follow up with Tawanna Tierney MD  
  
Phone:  [phone] Where:  [facility] [address], [address] [address] Follow up with [address] [place] [address] - please call them if they have not contacted you withinn 24 hours Phone:  [phone] Where:  [address] [facility], [address] [place] [address] Discharge Orders None A check mike indicates which time of day the medication should be taken. My Medications STOP taking these medications MOTRIN  mg tablet Generic drug:  ibuprofen TAKE these medications as instructed Instructions Each Dose to Equal  
 Morning Noon Evening Bedtime  
 acetaminophen 500 mg tablet Commonly known as:  TYLENOL Your last dose was: Your next dose is: Take 1 Tab by mouth every four (4) hours (while awake). Indications: Pain 500 mg  
    
   
   
   
  
 hydroCHLOROthiazide 25 mg tablet Commonly known as:  HYDRODIURIL Your last dose was: Your next dose is: Take 12.5 mg by mouth daily (after dinner). 12.5 mg  
    
   
   
   
  
 losartan 50 mg tablet Commonly known as:  COZAAR Your last dose was: Your next dose is: Take 50 mg by mouth daily (after dinner). 50 mg  
    
   
   
   
  
 oxyCODONE IR 5 mg immediate release tablet Commonly known as:  Urban Chute  
 Your last dose was: Your next dose is: Take 1-2 Tabs by mouth every four (4) hours as needed. Max Daily Amount: 60 mg. Indications: Pain 5-10 mg PARoxetine 20 mg tablet Commonly known as:  PAXIL Your last dose was: Your next dose is: Take 20 mg by mouth daily (after dinner). 20 mg  
    
   
   
   
  
 rosuvastatin 20 mg tablet Commonly known as:  CRESTOR Your last dose was: Your next dose is: Take 20 mg by mouth nightly. 20 mg  
    
   
   
   
  
 senna-docusate 8.6-50 mg per tablet Commonly known as:  Ilda Anon Your last dose was: Your next dose is: Take 1 Tab by mouth two (2) times a day. Indications: constipation 1 Tab  
    
   
   
   
  
 warfarin 2 mg tablet Commonly known as:  COUMADIN Your last dose was: Your next dose is: Take 5 mg by mouth daily at 6pm.  Dose may be adjusted based on twice weekly lab results. Indications: DEEP VEIN THROMBOSIS PREVENTION Where to Get Your Medications Information on where to get these meds will be given to you by the nurse or doctor. ! Ask your nurse or doctor about these medications  
  acetaminophen 500 mg tablet  
 oxyCODONE IR 5 mg immediate release tablet  
 senna-docusate 8.6-50 mg per tablet  
 warfarin 2 mg tablet Discharge Instructions After Hospital Care Plan:  Discharge Instructions Knee Replacement- Dr. Leola Clark Patient Name: Sonali Cutler Date of procedure: 11/21/2017 Procedure: Procedure(s): RIGHT TOTAL KNEE ARTHROPLASTY  (SPINAL WITH IV) Surgeon: Surgeon(s) and Role: Destinee Turner MD - Primary PCP: Kell Kline MD 
Date of discharge: No discharge date for patient encounter. Follow up appointments ? Follow up with Dr. Leola Clark in 4 weeks. Call [phone] to make an appointment. ? If home health has been arranged for you the agency will contact you to arrange dates/times for visits. Please call them if you do not hear from them within 24 hours after you are discharged When to call your Orthopaedic Surgeon: Call [phone]. If you call after 5pm or on a weekend, the on call physician will be contacted ? Unrelieved pain ? Signs of infection-if your incision is red; continues to have drainage; drainage has a foul odor or if you have a persistent fever over 101 degrees ? Signs of a blood clot in your leg-calf pain, tenderness, redness, swelling of lower leg When to call your Primary Care Physician: 
? Concerns about medical conditions such as diabetes, high blood pressure, asthma, congestive heart failure ? Call if blood sugars are elevated, persistent headache or dizziness, coughing or congestion, constipation or diarrhea, burning with urination, abnormal heart rate When to call [id number] go to the nearest emergency room ? Acute onset of chest pain, shortness of breath, difficulty breathing Activity ? Weight bearing as tolerated with walker or crutches. Refer to pages 23-31 of your handbook for instructions and pictures ? Complete your Home Exercise Program daily as instructed by your therapist.  Refer to pages 33-41 of your handbook for instructions and pictures ? Get up every one hour and walk (except at night when sleeping) ? Do not drive or operate heavy machinery Incision Care ? The Aquacel (brown, waterproof) surgical dressing is to remain on your knee for 7 days. On the 7th day have someone gently peel the dressing off by carefully lifting the edge and stretching it slightly to break the adhesive seal 
? If you have steri-strips (small, white pieces of tape) on your incision, they may come off when you remove the Aquacel surgical dressing. This is okay. You may now leave your incision open to air ? If your Aquacel dressing comes loose/off before the 7th day, you may replace it with a dry sterile gauze dressing; change it daily. Once you incision is not draining, you may leave it open to air ? You may take a shower with the Aquacel dressing in place. Once the Aquacel is removed, you may shower and get your incision wet but do not submerge your incision under water in a bath tub, hot tub or swimming pool for 6 weeks after surgery. Preventing blood clots ? Take Coumadin as prescribed by Dr. Jina Mraquez for one month following surgery Pain management ? Take pain medication as prescribed; decrease the amount you use as your pain lessens ? Avoid alcoholic beverages while taking pain medication ? Please be aware that many medications contain Tylenol. We do not want you to over medicate so please read the information below as a guide. Do not take more than 4 Grams of Tylenol in a 24 hour period. (There are 1000 milligrams in one Gram) o Percocet contains 325 mg of Tylenol per tablet (do not take more than 12 tablets in 24 hours) 
o Lortab contains 500 mg of Tylenol per tablet (do not take more than 8 tablets in 24 hours) o Norco contains 325 mg of Tylenol per tablet (do not take more than 12 tablets in 24 hours). ? You may place an ice bag on your hip for 15-20 minutes after exercising Diet ? Resume usual diet; drink plenty of fluids; eat foods high in fiber ? You may want to take a stool softener (such as Senokot-S or Colace) to prevent constipation while you are taking pain medication. If constipation occurs, take a laxative (such as Dulcolax tablets, Milk of Magnesia, or a suppository) Home Health Care Protocol (to be followed by [address]) Nursing-per physicians order ? Draw a PT/INR per physicians order and call results to Dr. Jina Marquez at [phone] ? Complete head to toe assessment, vital signs ? Medication reconciliation ? Review pain management ? Manage chronic medical conditions Physical Therapy-per physicians order Weight bearing status: 
Precautions at Admission: Fall, WBAT Left Side Weight Bearing: Full Right Side Weight Bearing: As tolerated Mobility Status: 
Supine to Sit: Contact guard assistance Sit to Stand: Contact guard assistance Sit to Supine: Contact guard assistance Gait: 
Distance (ft): 70 Feet (ft) Ambulation - Level of Assistance: Contact guard assistance, Additional time, Assist x1 Assistive Device: Gait belt, Walker, rolling Gait Abnormalities: Decreased step clearance, Step to gait, Antalgic ADL status overall composite: 
  
  
  
  
 
Physical Therapy ? Assessment and evaluation-bed mobility; functional transfers (bed, chair, bathroom, stairs); ambulation with equipment, car transfers, shower transfers, safety and ability to get out of house in the event of an emergency ? Review weight bearing as tolerated, wean from walker or crutches as tolerated ? Discuss pain management ? Review how to do ADLs. Refer to page 42 of patient handbook Home Exercise Program-refer to pages 33-41 of patient handbook for exercises. Introducing [facility]! [employer] introduces [employer] patient portal. Now you can access parts of your medical record, email your doctor's office, and request medication refills online. 1. In your internet browser, go to [email or web address] 2. Click on the First Time User? Click Here link in the Sign In box. You will see the New Member Sign Up page. 3. Enter your [employer] Access Code exactly as it appears below. You will not need to use this code after youve completed the sign-up process. If you do not sign up before the expiration date, you must request a new code. · [employer] Access Code: [email or web address] Expires: 1/10/2018 10:19 AM 
 
4.  Enter the last four digits of your Social Security Number (xxxx) and Date of Birth (mm/dd/yyyy) as indicated and click Submit. You will be taken to the next sign-up page. 5. Create a Sviral ID. This will be your [employer] login ID and cannot be changed, so think of one that is secure and easy to remember. 6. Create a [employer] password. You can change your password at any time. 7. Enter your Password Reset Question and Answer. This can be used at a later time if you forget your password. 8. Enter your e-mail address. You will receive e-mail notification when new information is available in [address] 9. Click Sign Up. You can now view and download portions of your medical record. 10. Click the Download Summary menu link to download a portable copy of your medical information. If you have questions, please visit the Frequently Asked Questions section of the [employer] website. Remember, [employer] is NOT to be used for urgent needs. For medical emergencies, dial 911. Now available from your iPhone and Android! Providers Seen During Your Hospitalization Provider Specialty Primary office phone Dominique Bolaños MD Orthopedic Surgery [phone] Your Primary Care Physician (PCP) Primary Care Physician Office Phone Office Fax Izetta Morning [phone] You are allergic to the following Allergen Reactions Bees (Sting, Bee) Anaphylaxis Clindamycin Anaphylaxis Recent Documentation Height Weight BMI OB Status Smoking Status 1.676 m 111.6 kg 39.71 kg/m2 Hysterectomy Former Smoker Emergency Contacts Name Discharge Info Relation Home Work Mobile Deniz Norbert 89 CAREGIVER [3] Spouse [3] [phone] Patient Belongings The following personal items are in your possession at time of discharge: 
  Dental Appliances: None                Clothing: At bedside Please provide this summary of care documentation to your next provider. Signatures-by signing, you are acknowledging that this After Visit Summary has been reviewed with you and you have received a copy. Patient Signature:  ____________________________________________________________ Date:  ____________________________________________________________  
  
Gwenyth Miles Provider Signature:  ____________________________________________________________ Date:  ____________________________________________________________

## 2017-11-21 NOTE — PERIOP NOTES
TRANSFER - OUT REPORT:    Verbal report given to Katie KARIMI(name) on Ruben Zaldivar  being transferred to 55(unit) for routine post - op       Report consisted of patients Situation, Background, Assessment and   Recommendations(SBAR). Time Pre op antibiotic given:1310  Anesthesia Stop time: 2007  Benavidez Present on Transfer to floor:y  Order for Benavidez on Chart:y    Information from the following report(s) SBAR and Kardex was reviewed with the receiving nurse. Opportunity for questions and clarification was provided. Is the patient on 02? NO       L/Min na       Other na    Is the patient on a monitor? NO    Is the nurse transporting with the patient? NO    Surgical Waiting Area notified of patient's transfer from PACU?  YES      The following personal items collected during your admission accompanied patient upon transfer:   Dental Appliance:    Vision:    Hearing Aid:    Jewelry:    Clothing:    Other Valuables:    Valuables sent to safe:

## 2017-11-21 NOTE — ANESTHESIA PREPROCEDURE EVALUATION
Anesthetic History   No history of anesthetic complications            Review of Systems / Medical History  Patient summary reviewed, nursing notes reviewed and pertinent labs reviewed    Pulmonary  Within defined limits                 Neuro/Psych   Within defined limits           Cardiovascular    Hypertension: well controlled              Exercise tolerance: >4 METS     GI/Hepatic/Renal     GERD           Endo/Other        Arthritis     Other Findings              Physical Exam    Airway  Mallampati: II  TM Distance: > 6 cm  Neck ROM: normal range of motion   Mouth opening: Normal     Cardiovascular  Regular rate and rhythm,  S1 and S2 normal,  no murmur, click, rub, or gallop             Dental  No notable dental hx       Pulmonary  Breath sounds clear to auscultation               Abdominal  GI exam deferred       Other Findings            Anesthetic Plan    ASA: 2  Anesthesia type: spinal      Post-op pain plan if not by surgeon: peripheral nerve block single    Induction: Intravenous  Anesthetic plan and risks discussed with: Patient

## 2017-11-21 NOTE — ROUTINE PROCESS
Patient: Raul Da Silva MRN: [id number]  SSN: [id number]   YOB: 1960  Age: 56 y.o. Sex: female     Patient is status post Procedure(s):  RIGHT TOTAL KNEE ARTHROPLASTY  (SPINAL WITH IV). Surgeon(s) and Role:     * Gato Andrade MD - Primary    Local/Dose/Irrigation:Right knee injection: 0.5% Marcaine w/ epinephrine 1:200,000 30 ml, Exparel 266 mg, Toradol 30 mg, Morphine 10 mg in 50 ml injectable saline                Peripheral IV 11/21/17 Right Antecubital (Active)                           Dressing/Packing:  Wound Knee Right-DRESSING TYPE: Cast padding;Elastic bandage;Silver products; Topical skin adhesive/glue (11/21/17 1300)  Splint/Cast:  ]    Other:

## 2017-11-21 NOTE — PROGRESS NOTES
Pharmacist Note - Warfarin Dosing  Consult provided for this 56 y.o. female to manage warfarin for VTE PPx s/p R TK    INR Goal: 1.7- 2.2  Therapy Day: 1  Preop Dose: Muñoz- None    Drugs that may increase INR: None  Drugs that may decrease INR: None  Other current anticoagulants/ drugs that may increase bleeding risk: NSAID  Risk factors: None  Daily INR ordered: Yes  No results for input(s): HGB, INR, HGBEXT in the last 72 hours. No lab exists for component: INREXT    Hgb, Hct & INR (1 Year)  Recent Labs      11/13/17   0834   HGB  12.7   HCT  38.6   INR  1.0     Date               INR                 Dose  11/13  1.0  ---  11/20  ---  None PreOp  11/21  ---  7.5 mg     Assessment/ Plan: Will order warfarin 7.5 mg PO x 1 dose. Pharmacy will continue to monitor daily and adjust therapy as indicated.

## 2017-11-21 NOTE — ANESTHESIA PROCEDURE NOTES
Peripheral Block    Start time: 11/21/2017 12:37 PM  End time: 11/21/2017 12:46 PM  Performed by: Lopez Porter  Authorized by: Lopez Porter       Pre-procedure: Indications: at surgeon's request, post-op pain management and procedure for pain    Preanesthetic Checklist: patient identified, risks and benefits discussed, site marked, timeout performed, anesthesia consent given and patient being monitored      Block Type:   Block Type:   Adductor canal  Laterality:  Right  Monitoring:  Standard ASA monitoring, continuous pulse ox, frequent vital sign checks, heart rate, oxygen and responsive to questions  Injection Technique:  Single shot  Procedures: ultrasound guided    Patient Position: supine  Prep: chlorhexidine    Location:  Mid thigh  Needle Gauge:  22 G  Needle Localization:  Ultrasound guidance  Medication Injected:  0.5%  ropivacaine  Volume (mL):  20    Assessment:  Number of attempts:  1  Injection Assessment:  Incremental injection every 5 mL, negative aspiration for CSF, no paresthesia, ultrasound image on chart, local visualized surrounding nerve on ultrasound, negative aspiration for blood and no intravascular symptoms  Patient tolerance:  Patient tolerated the procedure well with no immediate complications

## 2017-11-21 NOTE — PROGRESS NOTES
TRANSFER - IN REPORT:    Verbal report received from James(name) on Sanaz Ang  being received from [facility]) for routine progression of care      Report consisted of patients Situation, Background, Assessment and   Recommendations(SBAR). Information from the following report(s) SBAR, Kardex, OR Summary, Procedure Summary, Intake/Output, MAR and Recent Results was reviewed with the receiving nurse. Opportunity for questions and clarification was provided. Assessment completed upon patients arrival to unit and care assumed.

## 2017-11-21 NOTE — PROGRESS NOTES
Pt s/p right TKR and just arrived to the unit. Not yet cleared for PT, still numb with motor control in LEs not yet returned, received a spinal and peripheral nerve block.  Recommend that pt dangle with nursing later today once her motor and sensory function have returned and PT to see tomorrow for eval. Thank you, Casey Gibson, PT

## 2017-11-21 NOTE — PROGRESS NOTES
Primary Nurse Mimi Mckinney RN and Eloy Shah RN performed a dual skin assessment on this patient No impairment noted  James score is 19

## 2017-11-21 NOTE — ANESTHESIA PROCEDURE NOTES
Spinal Block    Start time: 11/21/2017 1:00 PM  End time: 11/21/2017 1:05 PM  Performed by: Ely Ospina  Authorized by: Griselda Chapman     Pre-procedure:   Indications: at surgeon's request and primary anesthetic  Preanesthetic Checklist: patient identified, risks and benefits discussed, anesthesia consent, site marked, patient being monitored and timeout performed    Timeout Time: 12:59          Spinal Block:   Patient Position:  Seated  Prep Region:  Lumbar  Prep: Betadine      Location:  L3-4  Technique:  Single shot  Local:  Lidocaine 1%  Local Dose (mL):  3    Needle:   Needle Type:  Pencil-tip  Needle Gauge:  25 G  Attempts:  1      Events: CSF confirmed, no blood with aspiration and no paresthesia        Assessment:  Insertion:  Uncomplicated

## 2017-11-21 NOTE — IP AVS SNAPSHOT
[address] 
[phone] Patient: Ruben Zaldivar MRN: [id number] KIW:[id number] My Medications STOP taking these medications MOTRIN  mg tablet Generic drug:  ibuprofen TAKE these medications as instructed Instructions Each Dose to Equal  
 Morning Noon Evening Bedtime  
 acetaminophen 500 mg tablet Commonly known as:  TYLENOL Your last dose was: Your next dose is: Take 1 Tab by mouth every four (4) hours (while awake). Indications: Pain 500 mg  
    
   
   
   
  
 hydroCHLOROthiazide 25 mg tablet Commonly known as:  HYDRODIURIL Your last dose was: Your next dose is: Take 12.5 mg by mouth daily (after dinner). 12.5 mg  
    
   
   
   
  
 losartan 50 mg tablet Commonly known as:  COZAAR Your last dose was: Your next dose is: Take 50 mg by mouth daily (after dinner). 50 mg  
    
   
   
   
  
 oxyCODONE IR 5 mg immediate release tablet Commonly known as:  Selene Canal Your last dose was: Your next dose is: Take 1-2 Tabs by mouth every four (4) hours as needed. Max Daily Amount: 60 mg. Indications: Pain 5-10 mg PARoxetine 20 mg tablet Commonly known as:  PAXIL Your last dose was: Your next dose is: Take 20 mg by mouth daily (after dinner). 20 mg  
    
   
   
   
  
 rosuvastatin 20 mg tablet Commonly known as:  CRESTOR Your last dose was: Your next dose is: Take 20 mg by mouth nightly. 20 mg  
    
   
   
   
  
 senna-docusate 8.6-50 mg per tablet Commonly known as:  Hayley Jose E Your last dose was: Your next dose is: Take 1 Tab by mouth two (2) times a day. Indications: constipation 1 Tab  
    
   
   
   
  
 warfarin 2 mg tablet Commonly known as:  COUMADIN Your last dose was: Your next dose is: Take 5 mg by mouth daily at 6pm.  Dose may be adjusted based on twice weekly lab results. Indications: DEEP VEIN THROMBOSIS PREVENTION Where to Get Your Medications Information on where to get these meds will be given to you by the nurse or doctor. ! Ask your nurse or doctor about these medications  
  acetaminophen 500 mg tablet  
 oxyCODONE IR 5 mg immediate release tablet  
 senna-docusate 8.6-50 mg per tablet  
 warfarin 2 mg tablet

## 2017-11-22 ENCOUNTER — HOME HEALTH ADMISSION (OUTPATIENT)
Dept: HOME HEALTH SERVICES | Facility: HOME HEALTH | Age: 57
End: 2017-11-22
Payer: COMMERCIAL

## 2017-11-22 VITALS
SYSTOLIC BLOOD PRESSURE: 132 MMHG | TEMPERATURE: 98 F | HEIGHT: 66 IN | RESPIRATION RATE: 16 BRPM | HEART RATE: 75 BPM | DIASTOLIC BLOOD PRESSURE: 75 MMHG | WEIGHT: 246 LBS | OXYGEN SATURATION: 93 % | BODY MASS INDEX: 39.53 KG/M2

## 2017-11-22 LAB
ANION GAP SERPL CALC-SCNC: 9 MMOL/L (ref 5–15)
BUN SERPL-MCNC: 13 MG/DL (ref 6–20)
BUN/CREAT SERPL: 22 (ref 12–20)
CALCIUM SERPL-MCNC: 8.2 MG/DL (ref 8.5–10.1)
CHLORIDE SERPL-SCNC: 103 MMOL/L (ref 97–108)
CO2 SERPL-SCNC: 26 MMOL/L (ref 21–32)
CREAT SERPL-MCNC: 0.58 MG/DL (ref 0.55–1.02)
GLUCOSE BLD STRIP.AUTO-MCNC: 137 MG/DL (ref 65–100)
GLUCOSE SERPL-MCNC: 126 MG/DL (ref 65–100)
HGB BLD-MCNC: 10.3 G/DL (ref 11.5–16)
INR PPP: 1.1 (ref 0.9–1.1)
POTASSIUM SERPL-SCNC: 4 MMOL/L (ref 3.5–5.1)
PROTHROMBIN TIME: 11 SEC (ref 9–11.1)
SERVICE CMNT-IMP: ABNORMAL
SODIUM SERPL-SCNC: 138 MMOL/L (ref 136–145)

## 2017-11-22 PROCEDURE — 74011250637 HC RX REV CODE- 250/637: Performed by: ORTHOPAEDIC SURGERY

## 2017-11-22 PROCEDURE — 97530 THERAPEUTIC ACTIVITIES: CPT

## 2017-11-22 PROCEDURE — 85610 PROTHROMBIN TIME: CPT | Performed by: ORTHOPAEDIC SURGERY

## 2017-11-22 PROCEDURE — 85018 HEMOGLOBIN: CPT | Performed by: ORTHOPAEDIC SURGERY

## 2017-11-22 PROCEDURE — 80048 BASIC METABOLIC PNL TOTAL CA: CPT | Performed by: ORTHOPAEDIC SURGERY

## 2017-11-22 PROCEDURE — 36415 COLL VENOUS BLD VENIPUNCTURE: CPT | Performed by: ORTHOPAEDIC SURGERY

## 2017-11-22 PROCEDURE — 82962 GLUCOSE BLOOD TEST: CPT

## 2017-11-22 PROCEDURE — 97116 GAIT TRAINING THERAPY: CPT

## 2017-11-22 PROCEDURE — 97161 PT EVAL LOW COMPLEX 20 MIN: CPT

## 2017-11-22 PROCEDURE — 74011250637 HC RX REV CODE- 250/637: Performed by: NURSE PRACTITIONER

## 2017-11-22 PROCEDURE — 74011250636 HC RX REV CODE- 250/636: Performed by: ORTHOPAEDIC SURGERY

## 2017-11-22 RX ORDER — ACETAMINOPHEN 500 MG
500 TABLET ORAL
Qty: 100 TAB | Refills: 0 | Status: SHIPPED
Start: 2017-11-22

## 2017-11-22 RX ORDER — ONDANSETRON 4 MG/1
4 TABLET, ORALLY DISINTEGRATING ORAL
Status: DISCONTINUED | OUTPATIENT
Start: 2017-11-22 | End: 2017-11-22 | Stop reason: HOSPADM

## 2017-11-22 RX ORDER — OXYCODONE HYDROCHLORIDE 5 MG/1
5-10 TABLET ORAL
Qty: 80 TAB | Refills: 0 | Status: SHIPPED | OUTPATIENT
Start: 2017-11-22 | End: 2022-03-17

## 2017-11-22 RX ORDER — AMOXICILLIN 250 MG
1 CAPSULE ORAL 2 TIMES DAILY
Qty: 60 TAB | Refills: 0 | Status: SHIPPED
Start: 2017-11-22 | End: 2022-03-17

## 2017-11-22 RX ORDER — WARFARIN 2 MG/1
TABLET ORAL
Qty: 90 TAB | Refills: 1 | Status: SHIPPED | OUTPATIENT
Start: 2017-11-22 | End: 2022-03-17

## 2017-11-22 RX ORDER — WARFARIN 7.5 MG/1
7.5 TABLET ORAL ONCE
Status: COMPLETED | OUTPATIENT
Start: 2017-11-22 | End: 2017-11-22

## 2017-11-22 RX ADMIN — KETOROLAC TROMETHAMINE 30 MG: 30 INJECTION, SOLUTION INTRAMUSCULAR at 17:30

## 2017-11-22 RX ADMIN — ACETAMINOPHEN 500 MG: 500 TABLET, FILM COATED ORAL at 15:26

## 2017-11-22 RX ADMIN — ACETAMINOPHEN 500 MG: 500 TABLET, FILM COATED ORAL at 11:48

## 2017-11-22 RX ADMIN — ONDANSETRON 4 MG: 2 INJECTION INTRAMUSCULAR; INTRAVENOUS at 09:24

## 2017-11-22 RX ADMIN — Medication 10 ML: at 17:31

## 2017-11-22 RX ADMIN — KETOROLAC TROMETHAMINE 30 MG: 30 INJECTION, SOLUTION INTRAMUSCULAR at 04:37

## 2017-11-22 RX ADMIN — DOCUSATE SODIUM AND SENNOSIDES 1 TABLET: 8.6; 5 TABLET, FILM COATED ORAL at 17:30

## 2017-11-22 RX ADMIN — SODIUM CHLORIDE 125 ML/HR: 900 INJECTION, SOLUTION INTRAVENOUS at 04:47

## 2017-11-22 RX ADMIN — Medication 10 ML: at 04:34

## 2017-11-22 RX ADMIN — KETOROLAC TROMETHAMINE 30 MG: 30 INJECTION, SOLUTION INTRAMUSCULAR at 11:36

## 2017-11-22 RX ADMIN — WARFARIN SODIUM 7.5 MG: 7.5 TABLET ORAL at 11:36

## 2017-11-22 RX ADMIN — PAROXETINE HYDROCHLORIDE 20 MG: 20 TABLET, FILM COATED ORAL at 17:28

## 2017-11-22 RX ADMIN — Medication 2 G: at 04:33

## 2017-11-22 RX ADMIN — HYDROCHLOROTHIAZIDE 12.5 MG: 25 TABLET ORAL at 17:30

## 2017-11-22 RX ADMIN — Medication 10 ML: at 04:38

## 2017-11-22 RX ADMIN — Medication 5 ML: at 14:00

## 2017-11-22 RX ADMIN — LOSARTAN POTASSIUM 50 MG: 50 TABLET ORAL at 17:29

## 2017-11-22 RX ADMIN — ACETAMINOPHEN 500 MG: 500 TABLET, FILM COATED ORAL at 06:37

## 2017-11-22 NOTE — PROGRESS NOTES
Pharmacist Note - Warfarin Dosing  Consult provided for this 56 y.o. female to manage warfarin for VTE PPx s/p R TK    INR Goal: 1.7- 2.2  Therapy Day: 2  Preop Dose: Muñoz- None    Drugs that may increase INR: None  Drugs that may decrease INR: None  Other current anticoagulants/ drugs that may increase bleeding risk: NSAID  Risk factors: None  Daily INR ordered: Yes  Recent Labs      11/22/17   0447   HGB  10.3*   INR  1.1     Date               INR                 Dose  11/13  1.0  ---  11/20  ---  None PreOp  11/21  ---  7.5 mg   11/22  1.1  7.5 mg    Assessment/ Plan: Will order warfarin 7.5 mg PO x 1 dose. Pharmacy will continue to monitor daily and adjust therapy as indicated.

## 2017-11-22 NOTE — PROGRESS NOTES
Problem: Mobility Impaired (Adult and Pediatric)  Goal: *Acute Goals and Plan of Care (Insert Text)  Physical Therapy Goals  Initiated 11/22/2017    1. Patient will move from supine to sit and sit to supine  in bed with independence within 4 days. 2. Patient will perform sit to stand with independence within 4 days. 3. Patient will ambulate with independence for 500 feet with the least restrictive device within 4 days. 4. Patient will ascend/descend 3 stairs with 2 handrail(s) with independence within 4 days. 5. Patient will perform home exercise program per protocol with independence within 4 days. 6. Patient will demonstrate AROM 0-90 degrees in operative joint within 4 days. physical Therapy TREATMENT  Patient: Ivy Roy (89 y.o. female)  Date: 11/22/2017  Diagnosis: RIGHT KNEE OA   Primary localized osteoarthritis of right knee <principal problem not specified>  Procedure(s) (LRB):  RIGHT TOTAL KNEE ARTHROPLASTY  (SPINAL WITH IV) (Right) 1 Day Post-Op  Precautions: Fall, WBAT    ASSESSMENT:  Pt making great progress with therapy this afternoon. No c/o n/v since this morning. Pt's son present and supportive. Pt SBA for bed mobility, transfers, gait training and stair completion. Pt tolerated increased agit training of 300ft and cleared on stair training (8 steps with bilateral rail use). Reviewed proper RW sequencing and provided visual demonstration to increase step length. Pt showed large improvements by end of session with increased practice. Equal weight shifting and no longer pausing to advance L LE. Son present for education on assistance with stair training. Pt has been completing her exercises and reviewed seated and standing exercises. Pt is cleared for discharge from a mobility stand point and with have assistance from family at discharge. HHPT to follow up.   Progression toward goals:  [x]      Improving appropriately and progressing toward goals  []      Improving slowly and progressing toward goals  []      Not making progress toward goals and plan of care will be adjusted     PLAN:  Patient continues to benefit from skilled intervention to address the above impairments. Continue treatment per established plan of care. Discharge Recommendations:  Home Health  Further Equipment Recommendations for Discharge:  none     SUBJECTIVE:   Dr. Ana Temple said it was up to you. I can go if you say so    OBJECTIVE DATA SUMMARY:   Range of Motion:  AROM: Generally decreased, functional        RLE AROM  R Knee Flexion: 85  R Knee Extension: 0              Functional Mobility Training:  Bed Mobility:     Supine to Sit: Stand-by asssistance  Sit to Supine: Contact guard assistance           Transfers:  Sit to Stand: Stand-by asssistance  Stand to Sit: Stand-by asssistance                             Ambulation/Gait Training:  Distance (ft): 300 Feet (ft)  Assistive Device: Gait belt;Walker, rolling  Ambulation - Level of Assistance: Stand-by asssistance        Gait Abnormalities: Decreased step clearance  Right Side Weight Bearing: As tolerated  Left Side Weight Bearing: Full  Base of Support: Widened  Stance: Right decreased  Speed/Nissa: Pace decreased (<100 feet/min)  Step Length: Left shortened  Swing Pattern: Right asymmetrical                 Stairs:  Number of Stairs Trained: 8  Stairs - Level of Assistance: Stand-by asssistance  Rail Use: Both  Therapeutic Exercises:   Exercises performed per protocol. See morning treatment note for description. Activity Tolerance:   Good  Please refer to the flowsheet for vital signs taken during this treatment.   After treatment:   [x] Patient left in no apparent distress sitting up in chair  [] Patient left in no apparent distress in bed  [x] Call bell left within reach  [x] Nursing notified  [] Caregiver present  [] Bed alarm activated    COMMUNICATION/COLLABORATION:   The patients plan of care was discussed with: Registered Nurse Osiris Hoover, PT   Time Calculation: 27 mins

## 2017-11-22 NOTE — CDMP QUERY
Patient is noted to have a BMI of 39.71. Please clarify if this patient is:     =>Obese (BMI 30 - 39.9)  =>Overweight (BMI 25 - 29.9)  =>Other explanation of clinical findings  =>Unable to determine (no explanation for clinical findings)    Presentation: 5'6\", 246 lbs = BMI 39.71    REFERENCE:  The [address] [place] [address] has issued a statement indicating that, \"Individuals who are overweight, obese, or morbidly obese are at an increased risk for certain medical conditions when compared to persons of normal weight. Therefore, these conditions are always clinically significant and reportable when documented by the provider. Please clarify and document your clinical opinion in the progress notes and discharge summary, including the definitive and or presumptive diagnosis, (suspected or probable), related to the above clinical findings. Please include clinical findings supporting your diagnosis.      Thank you  Tanner Thorne  [facility]  [phone]

## 2017-11-22 NOTE — PROGRESS NOTES
Problem: Discharge Planning  Goal: *Discharge to safe environment  Outcome: Resolved/Met Date Met: 11/22/17  Discharging to home with home health.

## 2017-11-22 NOTE — PROGRESS NOTES
Bedside and Verbal shift change report given to [address] (oncoming nurse) by Herbie Rankin (offgoing nurse). Report included the following information SBAR, Kardex, Intake/Output and MAR.

## 2017-11-22 NOTE — PROGRESS NOTES
Pain well controlled. No cp/sob.  No n/v.    Visit Vitals    /74 (BP 1 Location: Left arm, BP Patient Position: At rest)    Pulse 76    Temp 98.1 °F (36.7 °C)    Resp 16    Ht 5' 6\" (1.676 m)    Wt 111.6 kg (246 lb)    SpO2 94%    BMI 39.71 kg/m2       Alert  R knee dressing dry  Calves soft NT  Motor 5/5  Pulses symmetrical  Recent Results (from the past 12 hour(s))   METABOLIC PANEL, BASIC    Collection Time: 11/22/17  4:47 AM   Result Value Ref Range    Sodium 138 136 - 145 mmol/L    Potassium 4.0 3.5 - 5.1 mmol/L    Chloride 103 97 - 108 mmol/L    CO2 26 21 - 32 mmol/L    Anion gap 9 5 - 15 mmol/L    Glucose 126 (H) 65 - 100 mg/dL    BUN 13 6 - 20 MG/DL    Creatinine 0.58 0.55 - 1.02 MG/DL    BUN/Creatinine ratio 22 (H) 12 - 20      GFR est AA >60 >60 ml/min/1.73m2    GFR est non-AA >60 >60 ml/min/1.73m2    Calcium 8.2 (L) 8.5 - 10.1 MG/DL   HEMOGLOBIN    Collection Time: 11/22/17  4:47 AM   Result Value Ref Range    HGB 10.3 (L) 11.5 - 16.0 g/dL   PROTHROMBIN TIME + INR    Collection Time: 11/22/17  4:47 AM   Result Value Ref Range    INR 1.1 0.9 - 1.1      Prothrombin time 11.0 9.0 - 11.1 sec       POD 1 R TKA; acute postop blood loss anemia (expected)  -PT  -pain control  -coumadin  -home today vs tomorrow    Monalisa Cross MD

## 2017-11-22 NOTE — PROGRESS NOTES
..Bedside and Verbal shift change report given to Jenelle Myrick (oncoming nurse) by Sully Bueno (offgoing nurse). Report included the following information SBAR.

## 2017-11-22 NOTE — ANESTHESIA POSTPROCEDURE EVALUATION
Post-Anesthesia Evaluation and Assessment    Patient: Stefan Cuenca MRN: [id number]  SSN: [id number]    YOB: 1960  Age: 56 y.o. Sex: female       Cardiovascular Function/Vital Signs  Visit Vitals    /72 (BP 1 Location: Left arm, BP Patient Position: At rest)    Pulse 76    Temp 36.9 °C (98.5 °F)    Resp 16    Ht 5' 6\" (1.676 m)    Wt 111.6 kg (246 lb)    SpO2 94%    BMI 39.71 kg/m2       Patient is status post spinal anesthesia for Procedure(s):  RIGHT TOTAL KNEE ARTHROPLASTY  (SPINAL WITH IV). Nausea/Vomiting: None    Postoperative hydration reviewed and adequate. Pain:  Pain Scale 1: Numeric (0 - 10) (11/21/17 2002)  Pain Intensity 1: 8 (11/21/17 2002)   Managed    Neurological Status:   Neuro  Neurologic State: Alert; Appropriate for age (11/21/17 1709)  Orientation Level: Oriented X4 (11/21/17 1709)  LUE Motor Response: Purposeful (11/21/17 1709)  LLE Motor Response: Purposeful (11/21/17 1759)  RUE Motor Response: Purposeful (11/21/17 1709)  RLE Motor Response: Purposeful (11/21/17 1759)   At baseline    Mental Status and Level of Consciousness: Arousable    Pulmonary Status:   O2 Device: Room air (11/21/17 1708)   Adequate oxygenation and airway patent    Complications related to anesthesia: None    Post-anesthesia assessment completed.  No concerns    Signed By: Phani Perez MD     November 21, 2017

## 2017-11-22 NOTE — PROGRESS NOTES
and patient present. Nurse reviewed discharge instructions with both. Two prescriptions sent with them. Patient changing into clothes for discharge. Nurse dc'd iv site. All questions were addressed and patient has all belongings packed. Teck to take patient to discharge location via wheelchair.

## 2017-11-22 NOTE — PROGRESS NOTES
Care Management Assessment    **CM Consult - Home Health RN and PT, referral sent via [facility] to [facility]**    Apoorva Gonzalez is a 56year old female post op total knee replacement. PT/OT evaluated patient and recommended home health. Referral sent to [facility] and updated AVS. Verified face sheet and demographics. Lives in one story home with 3 steps to enter. Care Management Interventions  PCP Verified by CM: Yes  Transition of Care Consult (CM Consult): Home Health, Discharge Planning ([address] Referral - Physical Therapy and RN, referral sent to [facility])  [facility]: Yes  MyChart Signup: No  Discharge Durable Medical Equipment: No  Health Maintenance Reviewed: Yes  Physical Therapy Consult: Yes  Occupational Therapy Consult: Yes  Speech Therapy Consult: No  Current Support Network: Lives with Spouse (Lives with spouse, works at [employer] [address] prior to admission.  )  Confirm Follow Up Transport: Family  Plan discussed with Pt/Family/Caregiver: Yes (Met with patient and son in room.   She is agreeable with discharge plan to home with home health.  )  Freedom of Choice Offered: Yes  Discharge Location  Discharge Placement: Home with home health

## 2017-11-22 NOTE — OP NOTES
[address]   [address], [address]   OP NOTE       Name:  Nieves Wu   MR#:  [id number]   :  1960   Account #:  [de-identified]    Surgery Date:  2017   Date of Adm:  2017       SURGEON: Jun Pan MD.     FIRST ASSISTANT: Ilya Galindo. PREOPERATIVE DIAGNOSIS: Osteoarthritis of the right knee. POSTOPERATIVE DIAGNOSIS: Osteoarthritis of the right knee. PROCEDURES PERFORMED: Right total knee arthroplasty. COMPONENTS IMPLANTED: DePuy Attune size 7 posterior stabilized   femur, size 5 revision tibial tray with 13 x 15 mm cemented stem   extension, 7-mm posterior stabilized polyethylene insert, and 38 mm   patella. ANESTHESIA: Spinal sedation as well as an adductor canal block. COMPLICATIONS: None. ESTIMATED BLOOD LOSS: 100 mL. SPECIMENS REMOVED: None. INDICATIONS: The patient is a 72-year-old female with progressive   right knee pain due to severe osteoarthritis. Symptoms have   progressed despite comprehensive conservative treatment and she   presents for right total knee replacement. Risks, benefits, alternatives   of the procedure were reviewed with her in detail and she desires to   proceed. She understands her increased risk for perioperative medical   complications including infection due to obesity. PROCEDURE IN DETAIL: The anesthesia team placed an adductor   canal block before taking the patient to the operating room where they   also placed a spinal. Preoperative IV antibiotics were administered. Benavidez catheter was inserted and a padded pneumatic tourniquet was   placed around the right upper thigh. Right lower extremity was prepped   and draped in the usual sterile fashion. Tourniquet was inflated to 300. Through a midline anterior knee incision, I performed a medial   parapatellar arthrotomy.  Progressive medial release was performed to   facilitate exposure and soft tissue balance throughout the procedure. I   took 10 mm off the distal femur using a 5-degree distal femoral cutting   block over a long intramedullary tanika. The femur was sized to a 7 and   prepared for a size 7 posterior stabilized component utilizing   appropriate jigs. Rotational alignment was gauged off of Whitesides   line as well as the transepicondylar axis. Neutral proximal tibial   resection was performed using an extramedullary alignment guide. Menisci were excised and posterior osteophytes removed from the   femur. An extensive subperiosteal posterior capsular release was   performed due to significant preoperative flexion contracture. Progressive medial release was performed until there were   symmetrical and equal flexion, extension gaps with a 7-mm spacer   block. Trials were inserted and with the 7-mm insert in place, the knee   had full extension to gravity, satisfactory coronal plane stability and soft   tissue tension throughout arc of motion. The patella was prepared for a   38 mm component and tracked centrally using no-thumbs technique. Trials were removed and bony surfaces were copiously irrigated by   pulse lavage and dried before the real components were cemented   into place using antibiotic-impregnated cement. A 15 x 50 mm   cemented stem extension was utilized on the revision tibial tray to   augment fixation due to the patient's obesity. The knee was reduced in   full extension with the real insert in place during cement set up. Periarticular soft tissues were injected with a solution containing   Exparel, 0.5% Marcaine with epinephrine as well as morphine and   Toradol. Tourniquet was released and hemostasis obtained with the   Bovie. The wound was copiously irrigated. Arthrotomy was closed with   a combination of heavy Vicryl sutures and a running #2 Stratafix   suture. Skin and subcutaneous layers were closed in layered fashion   with Vicryl and a running Monocryl subcuticular stitch.  The wound was dressed with Dermabond and an Aquacel occlusive dressing as well as   a sterile compressive dressing. The patient was transported to the   postanesthesia care unit in stable condition. All counts were correct at   the end of the procedure.          Lillie Grubbs MD      [facility] / [facility]   D:  11/21/2017   23:43   T:  11/22/2017   11:13   Job #:  [id number]

## 2017-11-22 NOTE — PROGRESS NOTES
Problem: Mobility Impaired (Adult and Pediatric)  Goal: *Acute Goals and Plan of Care (Insert Text)  Physical Therapy Goals  Initiated 11/22/2017    1. Patient will move from supine to sit and sit to supine  in bed with independence within 4 days. 2. Patient will perform sit to stand with independence within 4 days. 3. Patient will ambulate with independence for 500 feet with the least restrictive device within 4 days. 4. Patient will ascend/descend 3 stairs with 2 handrail(s) with independence within 4 days. 5. Patient will perform home exercise program per protocol with independence within 4 days. 6. Patient will demonstrate AROM 0-90 degrees in operative joint within 4 days. physical Therapy knee EVALUATION  Patient: Isha Loza (74 y.o. female)  Date: 11/22/2017  Primary Diagnosis: RIGHT KNEE OA   Primary localized osteoarthritis of right knee  Procedure(s) (LRB):  RIGHT TOTAL KNEE ARTHROPLASTY  (SPINAL WITH IV) (Right) 1 Day Post-Op   Precautions:   Fall, WBAT    ASSESSMENT :  Based on the objective data described below, the patient presents with decreased ROM and strength of R knee, impaired balance without UE support, knee pain and gait abnormalities limiting functional mobility and independence following R TKA POD#1. PTA pt was independent, working full time and living with  in a single story home with 3 KARMA. Today pt reporting 4/10 pain at rest in operative leg. Performed supine exercises per protocol(ankle pumps, quad sets, glute sets, heel slides x 7 reps). Pt came to sitting using L LE to piggy back R leg. Modified LAQs at EOB to increase knee flexion. Pt requires CGA and RW for transfers and gait training. Ambulated with antalgic gait and step to pattern. Transient improvements with verbal and visual cues on the floor however reverts back to step to pattern without continued cueing. Pt has good UE strength to off load R knee during stance phase.  Returned to bed at end of session per nursing request as pt was nauseous while ambulating. Recommend up to chair to progress flexion ROM as soon as pt can tolerate. VSS throughout transfers and while ambulating. Anticipate pt will require at least 2 more therapy sessions before cleared for discharge. HHPT at discharge. Pt has RW at home already. Patient will benefit from skilled intervention to address the above impairments. Patients rehabilitation potential is considered to be Excellent  Factors which may influence rehabilitation potential include:   [x]         None noted  []         Mental ability/status  []         Medical condition  []         Home/family situation and support systems  []         Safety awareness  []         Pain tolerance/management  []         Other:      PLAN :  Recommendations and Planned Interventions:  [x]           Bed Mobility Training             [x]    Neuromuscular Re-Education  [x]           Transfer Training                   []    Orthotic/Prosthetic Training  [x]           Gait Training                         []    Modalities  [x]           Therapeutic Exercises           []    Edema Management/Control  [x]           Therapeutic Activities            [x]    Patient and Family Training/Education  []           Other (comment):    Frequency/Duration: Patient will be followed by physical therapy twice daily to address goals. Discharge Recommendations: Home Health  Further Equipment Recommendations for Discharge: None needed     SUBJECTIVE:   Patient stated I'm a little nauseous but it's not bad.     OBJECTIVE DATA SUMMARY:   HISTORY:    Past Medical History:   Diagnosis Date    Anemia     Arthritis     KNEE    Depression     GERD (gastroesophageal reflux disease)     hiatal hernia / reflux    Heart murmur     Hiatal hernia     High cholesterol     Hypertension     Psychiatric disorder     stress    PUD (peptic ulcer disease)      Past Surgical History:   Procedure Laterality Date    HX  SECTION 01/16/2001    HX CHOLECYSTECTOMY  1996    HX DILATION AND CURETTAGE  2009    x 2    HX GI      COLONOSCOPY    HX GI      ENDOSCOPY    HX HYSTERECTOMY  12/03/2009    PARTIAL    HX KNEE ARTHROSCOPY  06/15/2017    TORN MENISCUS    HX MENISCUS REPAIR Right     HX PARTIAL HYSTERECTOMY      [facility] COLSC FLX W/RMVL OF TUMOR POLYP LESION SNARE TQ  4/23/2012         [facility] EGD TRANSORAL BIOPSY SINGLE/MULTIPLE  1/5/2012         UPPER GI ENDOSCOPY,BIOPSY  1/6/2015          Prior Level of Function/Home Situation: Independent. Lives with  and works a desk job. Personal factors and/or comorbidities impacting plan of care: arthritis, HTN    Home Situation  Home Environment: Private residence  # Steps to Enter: 3  Rails to Enter: Yes  One/Two Story Residence: One story  Living Alone: No  Support Systems: Spouse/Significant Other/Partner  Patient Expects to be Discharged to[de-identified] Private residence  Current DME Used/Available at Home: Walker, rolling    EXAMINATION/PRESENTATION/DECISION MAKING:   Critical Behavior:  Neurologic State: Alert  Orientation Level: Oriented X4        Hearing: Auditory  Auditory Impairment: None    Range Of Motion:  AROM: Generally decreased, functional              RLE AROM  R Knee Flexion: 75  R Knee Extension: 0        Strength:    Strength: Generally decreased, functional                    Tone & Sensation:   Tone: Normal              Sensation: Intact               Coordination:  Coordination: Generally decreased, functional  Vision:      Functional Mobility:  Bed Mobility:     Supine to Sit: Contact guard assistance  Sit to Supine: Contact guard assistance     Transfers:  Sit to Stand: Contact guard assistance  Stand to Sit: Contact guard assistance                       Balance:   Sitting: Intact  Standing: Intact; With support  Ambulation/Gait Training:  Distance (ft): 70 Feet (ft)  Assistive Device: Gait belt;Walker, rolling  Ambulation - Level of Assistance: Contact guard assistance; Additional time;Assist x1        Gait Abnormalities: Decreased step clearance; Step to gait; Antalgic  Right Side Weight Bearing: As tolerated  Left Side Weight Bearing: Full  Base of Support: Widened  Stance: Right decreased  Speed/Nissa: Pace decreased (<100 feet/min); Slow  Step Length: Left shortened  Swing Pattern: Right asymmetrical             Functional Measure:  Tinetti test:    Sitting Balance: 1  Arises: 1  Attempts to Rise: 1  Immediate Standing Balance: 1  Standing Balance: 2  Nudged: 2  Eyes Closed: 1  Turn 360 Degrees - Continuous/Discontinuous: 1  Turn 360 Degrees - Steady/Unsteady: 1  Sitting Down: 1  Balance Score: 12  Indication of Gait: 1  R Step Length/Height: 1  L Step Length/Height: 0  R Foot Clearance: 1  L Foot Clearance: 1  Step Symmetry: 0  Step Continuity: 0  Path: 1  Trunk: 1  Walking Time: 1  Gait Score: 7  Total Score: 19       Tinetti Test and G-code impairment scale:  Percentage of Impairment CH    0%   CI    1-19% CJ    20-39% CK    40-59% CL    60-79% CM    80-99% CN     100%   Tinetti  Score 0-28 28 23-27 17-22 12-16 6-11 1-5 0       Tinetti Tool Score Risk of Falls  <19 = High Fall Risk  19-24 = Moderate Fall Risk  25-28 = Low Fall Risk  Tinetti ME. Performance-Oriented Assessment of Mobility Problems in Elderly Patients. Jay 66; [id number]. (Scoring Description: PT Bulletin Feb. 10, 1993)    Older adults: Bubba Hassan et al, 2009; n = 1000 [facility] elderly evaluated with ABC, MALGORZATA, ADL, and IADL)  · Mean MALGORZATA score for males aged 69-68 years = 26.21(3.40)  · Mean MALGORZATA score for females age 69-68 years = 25.16(4.30)  · Mean MALGORZATA score for males over 80 years = 23.29(6.02)  · Mean MALGORZATA score for females over 80 years = 17.20(8.32)         G codes: In compliance with CMSs Claims Based Outcome Reporting, the following G-code set was chosen for this patient based on their primary functional limitation being treated:     The outcome measure chosen to determine the severity of the functional limitation was the Tinetti with a score of 19/28 which was correlated with the impairment scale. ? Mobility - Walking and Moving Around:     - CURRENT STATUS: CJ - 20%-39% impaired, limited or restricted    - GOAL STATUS: CI - 1%-19% impaired, limited or restricted    - D/C STATUS:  ---------------To be determined---------------        Physical Therapy Evaluation Charge Determination   History Examination Presentation Decision-Making   MEDIUM  Complexity : 1-2 comorbidities / personal factors will impact the outcome/ POC  MEDIUM Complexity : 3 Standardized tests and measures addressing body structure, function, activity limitation and / or participation in recreation  LOW Complexity : Stable, uncomplicated  LOW Complexity : FOTO score of       Based on the above components, the patient evaluation is determined to be of the following complexity level: LOW     Activity Tolerance:   Good  Please refer to the flowsheet for vital signs taken during this treatment. After treatment:   []         Patient left in no apparent distress sitting up in chair  [x]         Patient left in no apparent distress in bed  [x]         Call bell left within reach  [x]         Nursing notified  []         Caregiver present  []         Bed alarm activated    COMMUNICATION/EDUCATION:   The patients plan of care was discussed with: Registered Nurse. [x]         Fall prevention education was provided and the patient/caregiver indicated understanding. [x]         Patient/family have participated as able in goal setting and plan of care. [x]         Patient/family agree to work toward stated goals and plan of care. []         Patient understands intent and goals of therapy, but is neutral about his/her participation. []         Patient is unable to participate in goal setting and plan of care.     Thank you for this referral.  Goran Schultz, PT   Time Calculation: 25 mins

## 2017-11-22 NOTE — PROGRESS NOTES
Care Management    Received call from [facility], unable to see for home care until Saturday -11/25, call made to MD office, Bess/Coverning nurse for Dr. Blu Shetty nurse she has made note in chart and states that is acceptable with the holiday weekend.     Julio C Ruggiero, CRM

## 2017-11-22 NOTE — PROGRESS NOTES
Bedside and Verbal shift change report given to natasha (oncoming nurse) by Kindra Barriga (offgoing nurse). Report included the following information SBAR, Kardex, Procedure Summary, Intake/Output, MAR and Recent Results.

## 2017-11-22 NOTE — DISCHARGE INSTRUCTIONS
After Hospital Care Plan:  Discharge Instructions Knee Replacement- Dr. Leydi Vasquez    Patient Name: Shai Onofre  Date of procedure: 11/21/2017   Procedure: Procedure(s):  RIGHT TOTAL KNEE ARTHROPLASTY  (SPINAL WITH IV)  Surgeon: Raquel Carmona) and Role:     * Mimi Wheeler MD - Primary  PCP: Jesus Jasmine MD  Date of discharge: No discharge date for patient encounter. Follow up appointments   Follow up with Dr. Leydi Vasquez in 4 weeks. Call [phone] to make an appointment.  If home health has been arranged for you the agency will contact you to arrange dates/times for visits. Please call them if you do not hear from them within 24 hours after you are discharged    When to call your Orthopaedic Surgeon: Call [phone]. If you call after 5pm or on a weekend, the on call physician will be contacted   Unrelieved pain   Signs of infection-if your incision is red; continues to have drainage; drainage has a foul odor or if you have a persistent fever over 101 degrees   Signs of a blood clot in your leg-calf pain, tenderness, redness, swelling of lower leg    When to call your Primary Care Physician:   Concerns about medical conditions such as diabetes, high blood pressure, asthma, congestive heart failure   Call if blood sugars are elevated, persistent headache or dizziness, coughing or congestion, constipation or diarrhea, burning with urination, abnormal heart rate    When to call [id number] go to the nearest emergency room   Acute onset of chest pain, shortness of breath, difficulty breathing      Activity   Weight bearing as tolerated with walker or crutches.  Refer to pages 23-31 of your handbook for instructions and pictures   Complete your Home Exercise Program daily as instructed by your therapist.  Refer to pages 33-41 of your handbook for instructions and pictures   Get up every one hour and walk (except at night when sleeping)   Do not drive or operate heavy machinery      Incision Care   The Aquacel (brown, waterproof) surgical dressing is to remain on your knee for 7 days. On the 7th day have someone gently peel the dressing off by carefully lifting the edge and stretching it slightly to break the adhesive seal   If you have steri-strips (small, white pieces of tape) on your incision, they may come off when you remove the Aquacel surgical dressing. This is okay. You may now leave your incision open to air   If your Aquacel dressing comes loose/off before the 7th day, you may replace it with a dry sterile gauze dressing; change it daily. Once you incision is not draining, you may leave it open to air   You may take a shower with the Aquacel dressing in place. Once the Aquacel is removed, you may shower and get your incision wet but do not submerge your incision under water in a bath tub, hot tub or swimming pool for 6 weeks after surgery. Preventing blood clots    Take Coumadin as prescribed by Dr. Leydi Vasquez for one month following surgery    Pain management   Take pain medication as prescribed; decrease the amount you use as your pain lessens   Avoid alcoholic beverages while taking pain medication   Please be aware that many medications contain Tylenol. We do not want you to over medicate so please read the information below as a guide. Do not take more than 4 Grams of Tylenol in a 24 hour period. (There are 1000 milligrams in one Gram)  o Percocet contains 325 mg of Tylenol per tablet (do not take more than 12 tablets in 24 hours)  o Lortab contains 500 mg of Tylenol per tablet (do not take more than 8 tablets in 24 hours)  o Norco contains 325 mg of Tylenol per tablet (do not take more than 12 tablets in 24 hours).    You may place an ice bag on your hip for 15-20 minutes after exercising    Diet   Resume usual diet; drink plenty of fluids; eat foods high in fiber   You may want to take a stool softener (such as Senokot-S or Colace) to prevent constipation while you are taking pain medication. If constipation occurs, take a laxative (such as Dulcolax tablets, Milk of Magnesia, or a suppository)      [address] [place] [facility] Protocol (to be followed by [address])  Nursing-per physicians order  Joselyn Ciera 20 a PT/INR per physicians order and call results to Dr. Efraín booker at [phone]  [facility] Complete head to toe assessment, vital signs   Medication reconciliation   Review pain management   Manage chronic medical conditions    Physical Therapy-per physicians order  Weight bearing status:  Precautions at Admission: Fall, WBAT  Left Side Weight Bearing: Full  Right Side Weight Bearing: As tolerated    Mobility Status:  Supine to Sit: Contact guard assistance  Sit to Stand: Contact guard assistance  Sit to Supine: Contact guard assistance     Gait:  Distance (ft): 70 Feet (ft)  Ambulation - Level of Assistance: Contact guard assistance, Additional time, Assist x1  Assistive Device: Gait belt, Walker, rolling  Gait Abnormalities: Decreased step clearance, Step to gait, Antalgic    ADL status overall composite:                Physical Therapy   Assessment and evaluation-bed mobility; functional transfers (bed, chair, bathroom, stairs); ambulation with equipment, car transfers, shower transfers, safety and ability to get out of house in the event of an emergency   Review weight bearing as tolerated, wean from walker or crutches as tolerated   Discuss pain management   Review how to do ADLs. Refer to page 42 of patient handbook    Home Exercise Program-refer to pages 33-41 of patient handbook for exercises.

## 2017-11-24 ENCOUNTER — HOME CARE VISIT (OUTPATIENT)
Dept: SCHEDULING | Facility: HOME HEALTH | Age: 57
End: 2017-11-24
Payer: COMMERCIAL

## 2017-11-24 PROCEDURE — G0151 HHCP-SERV OF PT,EA 15 MIN: HCPCS

## 2017-11-26 ENCOUNTER — HOME CARE VISIT (OUTPATIENT)
Dept: SCHEDULING | Facility: HOME HEALTH | Age: 57
End: 2017-11-26
Payer: COMMERCIAL

## 2017-11-26 VITALS
OXYGEN SATURATION: 98 % | RESPIRATION RATE: 16 BRPM | SYSTOLIC BLOOD PRESSURE: 136 MMHG | HEART RATE: 87 BPM | DIASTOLIC BLOOD PRESSURE: 79 MMHG

## 2017-11-26 VITALS
DIASTOLIC BLOOD PRESSURE: 78 MMHG | OXYGEN SATURATION: 98 % | SYSTOLIC BLOOD PRESSURE: 134 MMHG | HEART RATE: 76 BPM | RESPIRATION RATE: 17 BRPM | TEMPERATURE: 98 F

## 2017-11-26 PROCEDURE — G0299 HHS/HOSPICE OF RN EA 15 MIN: HCPCS

## 2017-11-27 ENCOUNTER — HOME CARE VISIT (OUTPATIENT)
Dept: SCHEDULING | Facility: HOME HEALTH | Age: 57
End: 2017-11-27
Payer: COMMERCIAL

## 2017-11-27 VITALS
DIASTOLIC BLOOD PRESSURE: 82 MMHG | OXYGEN SATURATION: 98 % | SYSTOLIC BLOOD PRESSURE: 132 MMHG | RESPIRATION RATE: 17 BRPM | HEART RATE: 89 BPM | TEMPERATURE: 98.8 F

## 2017-11-27 PROCEDURE — G0157 HHC PT ASSISTANT EA 15: HCPCS

## 2017-11-27 PROCEDURE — G0299 HHS/HOSPICE OF RN EA 15 MIN: HCPCS

## 2017-11-29 ENCOUNTER — HOME CARE VISIT (OUTPATIENT)
Dept: SCHEDULING | Facility: HOME HEALTH | Age: 57
End: 2017-11-29
Payer: COMMERCIAL

## 2017-11-29 PROCEDURE — G0157 HHC PT ASSISTANT EA 15: HCPCS

## 2017-11-30 VITALS
SYSTOLIC BLOOD PRESSURE: 154 MMHG | RESPIRATION RATE: 16 BRPM | TEMPERATURE: 98.2 F | DIASTOLIC BLOOD PRESSURE: 98 MMHG | HEART RATE: 76 BPM | OXYGEN SATURATION: 95 %

## 2017-11-30 VITALS
TEMPERATURE: 98.3 F | RESPIRATION RATE: 20 BRPM | SYSTOLIC BLOOD PRESSURE: 136 MMHG | DIASTOLIC BLOOD PRESSURE: 64 MMHG | HEART RATE: 87 BPM | OXYGEN SATURATION: 96 %

## 2017-12-01 ENCOUNTER — HOME CARE VISIT (OUTPATIENT)
Dept: SCHEDULING | Facility: HOME HEALTH | Age: 57
End: 2017-12-01
Payer: COMMERCIAL

## 2017-12-01 PROCEDURE — G0157 HHC PT ASSISTANT EA 15: HCPCS

## 2017-12-01 PROCEDURE — G0300 HHS/HOSPICE OF LPN EA 15 MIN: HCPCS

## 2017-12-04 ENCOUNTER — HOME CARE VISIT (OUTPATIENT)
Dept: SCHEDULING | Facility: HOME HEALTH | Age: 57
End: 2017-12-04
Payer: COMMERCIAL

## 2017-12-04 VITALS
TEMPERATURE: 98.3 F | SYSTOLIC BLOOD PRESSURE: 148 MMHG | DIASTOLIC BLOOD PRESSURE: 88 MMHG | HEART RATE: 74 BPM | OXYGEN SATURATION: 98 % | RESPIRATION RATE: 17 BRPM

## 2017-12-04 LAB
INR BLD: 1.4 (ref 0.9–1.1)
PT POC: 17.3 SECONDS (ref 11.8–14.9)

## 2017-12-04 PROCEDURE — G0157 HHC PT ASSISTANT EA 15: HCPCS

## 2017-12-04 PROCEDURE — G0299 HHS/HOSPICE OF RN EA 15 MIN: HCPCS

## 2017-12-05 ENCOUNTER — HOSPITAL ENCOUNTER (OUTPATIENT)
Dept: ULTRASOUND IMAGING | Age: 57
Discharge: HOME | End: 2017-12-05
Attending: ORTHOPAEDIC SURGERY
Payer: COMMERCIAL

## 2017-12-05 VITALS
TEMPERATURE: 98.5 F | HEART RATE: 89 BPM | SYSTOLIC BLOOD PRESSURE: 126 MMHG | RESPIRATION RATE: 12 BRPM | DIASTOLIC BLOOD PRESSURE: 74 MMHG | OXYGEN SATURATION: 97 %

## 2017-12-05 VITALS
OXYGEN SATURATION: 97 % | SYSTOLIC BLOOD PRESSURE: 140 MMHG | DIASTOLIC BLOOD PRESSURE: 78 MMHG | HEART RATE: 71 BPM | TEMPERATURE: 98.3 F | RESPIRATION RATE: 17 BRPM

## 2017-12-05 VITALS
DIASTOLIC BLOOD PRESSURE: 86 MMHG | HEART RATE: 77 BPM | SYSTOLIC BLOOD PRESSURE: 128 MMHG | RESPIRATION RATE: 14 BRPM | TEMPERATURE: 98.1 F | OXYGEN SATURATION: 99 %

## 2017-12-05 DIAGNOSIS — M79.669 CALF PAIN: ICD-10-CM

## 2017-12-05 PROCEDURE — 93971 EXTREMITY STUDY: CPT

## 2017-12-06 ENCOUNTER — HOME CARE VISIT (OUTPATIENT)
Dept: SCHEDULING | Facility: HOME HEALTH | Age: 57
End: 2017-12-06
Payer: COMMERCIAL

## 2017-12-06 PROCEDURE — G0157 HHC PT ASSISTANT EA 15: HCPCS

## 2017-12-07 ENCOUNTER — HOME CARE VISIT (OUTPATIENT)
Dept: SCHEDULING | Facility: HOME HEALTH | Age: 57
End: 2017-12-07
Payer: COMMERCIAL

## 2017-12-07 VITALS
SYSTOLIC BLOOD PRESSURE: 142 MMHG | RESPIRATION RATE: 17 BRPM | DIASTOLIC BLOOD PRESSURE: 80 MMHG | TEMPERATURE: 98 F | OXYGEN SATURATION: 99 % | HEART RATE: 74 BPM

## 2017-12-07 LAB
INR BLD: 1.9 (ref 0.9–1.1)
PT POC: 22.5 SECONDS (ref 11.8–14.9)

## 2017-12-07 PROCEDURE — G0300 HHS/HOSPICE OF LPN EA 15 MIN: HCPCS

## 2017-12-08 ENCOUNTER — HOME CARE VISIT (OUTPATIENT)
Dept: SCHEDULING | Facility: HOME HEALTH | Age: 57
End: 2017-12-08
Payer: COMMERCIAL

## 2017-12-08 VITALS
DIASTOLIC BLOOD PRESSURE: 82 MMHG | HEART RATE: 78 BPM | SYSTOLIC BLOOD PRESSURE: 144 MMHG | TEMPERATURE: 96.6 F | OXYGEN SATURATION: 98 % | RESPIRATION RATE: 16 BRPM

## 2017-12-08 PROCEDURE — G0157 HHC PT ASSISTANT EA 15: HCPCS

## 2017-12-09 NOTE — DISCHARGE SUMMARY
@6HW@ [address] [place] [id number]    DISCHARGE SUMMARY     Patient: Kylah Villa Record Number: [id number]                : 1960  Age: 56 y.o. Admit Date: 2017  Discharge Date: 2017  Admission Diagnosis: RIGHT KNEE OA   Primary localized osteoarthritis of right knee  Discharge Diagnosis: RIGHT KNEE OA   Procedures: Procedure(s):  RIGHT TOTAL KNEE ARTHROPLASTY  (SPINAL WITH IV)  Surgeon: Triny Lockhart MD  Anesthesia: spinal  Complications: None     History of Present Illness:  My Davey is a 56 y.o. female with a history of Right knee pain, swelling, and marked loss of function. Despite conservative management and after clinical and radiographic evaluation, it was determined that she suffered from end-stage osteoarthritis and would benefit from Procedure(s):  RIGHT TOTAL KNEE ARTHROPLASTY  (SPINAL WITH IV), which she consented to undergo after a discussion of the risks, benefits, alternatives, rehab concerns, and potential complications of surgery. Hospital Course:  My Davey tolerated the procedure well. She was transferred  to the recovery room in stable condition. After a brief stay the patient was then transferred to the Joint Replacement Unit at [address] [place] [address].  On postoperative day #1, the dressing was clean and dry, she was neurovascularly intact. The patient was afebrile and vital signs were stable. Calves were soft and non-tender bilaterally. She was discharged to Home in stable condition on postoperative day 1. She was provided with routine postoperative instructions and advised to follow up in my office in 3 weeks following discharge from the hospital.  She was prescribed Coumadin for DVT prophylaxis and oxycodone for post-operative pain.     Discharge Medications:      ACETAMINOPHEN 500 mg Oral Q4HWA        HYDROCHLOROTHIAZIDE 12.5 mg Oral PCD        LOSARTAN POTASSIUM 50 mg Oral PCD       OXYCODONE HCL 5-10 mg Oral Q4H PRN        PAROXETINE HCL 20 mg Oral PCD        ROSUVASTATIN CALCIUM 20 mg Oral QHS       SENNOSIDES/DOCUSATE SODIUM 8.6-50 mg 1 Tab Oral BID       WARFARIN SODIUM 2 mg Take 5 mg by mouth daily at 6pm.  Dose may be adjusted based on twice weekly lab results.          Signed by: Fransico Blue MD  12/9/2017

## 2017-12-10 VITALS
TEMPERATURE: 97.8 F | HEART RATE: 79 BPM | DIASTOLIC BLOOD PRESSURE: 78 MMHG | OXYGEN SATURATION: 97 % | SYSTOLIC BLOOD PRESSURE: 130 MMHG | RESPIRATION RATE: 17 BRPM

## 2017-12-11 ENCOUNTER — HOME CARE VISIT (OUTPATIENT)
Dept: SCHEDULING | Facility: HOME HEALTH | Age: 57
End: 2017-12-11
Payer: COMMERCIAL

## 2017-12-11 ENCOUNTER — HOME CARE VISIT (OUTPATIENT)
Dept: HOME HEALTH SERVICES | Facility: HOME HEALTH | Age: 57
End: 2017-12-11
Payer: COMMERCIAL

## 2017-12-11 VITALS
RESPIRATION RATE: 16 BRPM | TEMPERATURE: 98.6 F | HEART RATE: 77 BPM | SYSTOLIC BLOOD PRESSURE: 112 MMHG | DIASTOLIC BLOOD PRESSURE: 80 MMHG | OXYGEN SATURATION: 97 %

## 2017-12-11 LAB
INR BLD: 2.7 (ref 0.9–1.1)
PT POC: 32.4 SECONDS (ref 11.8–14.9)

## 2017-12-11 PROCEDURE — G0157 HHC PT ASSISTANT EA 15: HCPCS

## 2017-12-11 PROCEDURE — G0300 HHS/HOSPICE OF LPN EA 15 MIN: HCPCS

## 2017-12-12 VITALS
DIASTOLIC BLOOD PRESSURE: 80 MMHG | HEART RATE: 77 BPM | SYSTOLIC BLOOD PRESSURE: 130 MMHG | TEMPERATURE: 98.7 F | OXYGEN SATURATION: 97 % | RESPIRATION RATE: 17 BRPM

## 2017-12-13 ENCOUNTER — HOME CARE VISIT (OUTPATIENT)
Dept: SCHEDULING | Facility: HOME HEALTH | Age: 57
End: 2017-12-13
Payer: COMMERCIAL

## 2017-12-13 VITALS
OXYGEN SATURATION: 98 % | TEMPERATURE: 98 F | HEART RATE: 81 BPM | DIASTOLIC BLOOD PRESSURE: 88 MMHG | SYSTOLIC BLOOD PRESSURE: 124 MMHG

## 2017-12-13 PROCEDURE — G0157 HHC PT ASSISTANT EA 15: HCPCS

## 2017-12-14 ENCOUNTER — HOME CARE VISIT (OUTPATIENT)
Dept: SCHEDULING | Facility: HOME HEALTH | Age: 57
End: 2017-12-14
Payer: COMMERCIAL

## 2017-12-14 LAB
INR BLD: 2.2 (ref 0.9–1.1)
PT POC: 26.4 SECONDS (ref 11.8–14.9)

## 2017-12-14 PROCEDURE — G0300 HHS/HOSPICE OF LPN EA 15 MIN: HCPCS

## 2017-12-15 ENCOUNTER — HOME CARE VISIT (OUTPATIENT)
Dept: SCHEDULING | Facility: HOME HEALTH | Age: 57
End: 2017-12-15
Payer: COMMERCIAL

## 2017-12-15 PROCEDURE — G0151 HHCP-SERV OF PT,EA 15 MIN: HCPCS

## 2017-12-17 VITALS
OXYGEN SATURATION: 97 % | SYSTOLIC BLOOD PRESSURE: 122 MMHG | RESPIRATION RATE: 17 BRPM | HEART RATE: 66 BPM | TEMPERATURE: 98.7 F | DIASTOLIC BLOOD PRESSURE: 78 MMHG

## 2017-12-17 VITALS
OXYGEN SATURATION: 98 % | TEMPERATURE: 98.5 F | DIASTOLIC BLOOD PRESSURE: 86 MMHG | SYSTOLIC BLOOD PRESSURE: 130 MMHG | HEART RATE: 83 BPM | RESPIRATION RATE: 17 BRPM

## 2019-03-01 ENCOUNTER — HOSPITAL ENCOUNTER (OUTPATIENT)
Dept: MRI IMAGING | Age: 59
Discharge: HOME | End: 2019-03-01
Attending: ORTHOPAEDIC SURGERY
Payer: COMMERCIAL

## 2019-03-01 DIAGNOSIS — M25.562 LEFT KNEE PAIN: ICD-10-CM

## 2019-03-01 PROCEDURE — 73721 MRI JNT OF LWR EXTRE W/O DYE: CPT

## 2020-02-18 DIAGNOSIS — M25.562 LEFT KNEE PAIN, UNSPECIFIED CHRONICITY: Primary | ICD-10-CM

## 2020-02-19 ENCOUNTER — HOSPITAL ENCOUNTER (OUTPATIENT)
Dept: GENERAL RADIOLOGY | Age: 60
Discharge: HOME | End: 2020-02-19
Payer: COMMERCIAL

## 2020-02-19 ENCOUNTER — OFFICE VISIT (OUTPATIENT)
Dept: ORTHOPEDIC SURGERY | Age: 60
End: 2020-02-19

## 2020-02-19 VITALS
DIASTOLIC BLOOD PRESSURE: 97 MMHG | SYSTOLIC BLOOD PRESSURE: 151 MMHG | HEIGHT: 66 IN | OXYGEN SATURATION: 97 % | WEIGHT: 259 LBS | BODY MASS INDEX: 41.62 KG/M2 | HEART RATE: 80 BPM

## 2020-02-19 DIAGNOSIS — M25.562 LEFT KNEE PAIN, UNSPECIFIED CHRONICITY: ICD-10-CM

## 2020-02-19 DIAGNOSIS — M23.204 DEGENERATIVE TEAR OF MEDIAL MENISCUS OF LEFT KNEE: Primary | ICD-10-CM

## 2020-02-19 PROCEDURE — 73560 X-RAY EXAM OF KNEE 1 OR 2: CPT

## 2020-02-19 RX ORDER — ATORVASTATIN CALCIUM 10 MG/1
TABLET, FILM COATED ORAL
Status: ON HOLD | COMMUNITY
Start: 2015-06-02 | End: 2022-03-22

## 2020-02-19 NOTE — PROGRESS NOTES
2020      CC: Left knee pain    HPI:      This is a 64y.o. year old patient who complains of left knee pain, this is on the medial aspect of the knee, it has been present for years. Onset was gradual.  This pain is activity dependent, it causes a significant amount of difficulty with athletic activities and stairs. The patient denies mechanical symptoms and clicking within the knee. PMH:  Past Medical History:   Diagnosis Date    Anemia     Arthritis     KNEE    Depression     GERD (gastroesophageal reflux disease)     hiatal hernia / reflux    Heart murmur     Hiatal hernia     High cholesterol     Hypertension     Psychiatric disorder     stress    PUD (peptic ulcer disease)        PSxHx:  Past Surgical History:   Procedure Laterality Date    HX  SECTION  2001    HX CHOLECYSTECTOMY      HX DILATION AND CURETTAGE  2009    x 2    HX GI      COLONOSCOPY    HX GI      ENDOSCOPY    HX HYSTERECTOMY  2009    PARTIAL    HX KNEE ARTHROSCOPY  06/15/2017    TORN MENISCUS    HX MENISCUS REPAIR Right     HX PARTIAL HYSTERECTOMY      RI COLSC FLX W/RMVL OF TUMOR POLYP LESION SNARE TQ  2012         RI EGD TRANSORAL BIOPSY SINGLE/MULTIPLE  2012         UPPER GI ENDOSCOPY,BIOPSY  2015            Meds:    Current Outpatient Medications:     atorvastatin (LIPITOR) 10 mg tablet, Lipitor 10 mg tablet  Prescribed by non [facility] MD, Disp: , Rfl:     acetaminophen (TYLENOL) 500 mg tablet, Take 1 Tab by mouth every four (4) hours (while awake). Indications: Pain, Disp: 100 Tab, Rfl: 0    losartan (COZAAR) 50 mg tablet, Take 50 mg by mouth daily (after dinner). , Disp: , Rfl:     warfarin (COUMADIN) 2 mg tablet, Take 6 mg by mouth daily. Instructed by Dr Elen Neal Pt not taking, Disp: , Rfl:     oxyCODONE IR (ROXICODONE) 5 mg immediate release tablet, Take 1-2 Tabs by mouth every four (4) hours as needed. Max Daily Amount: 60 mg.  Indications: Pain, Disp: 80 Tab, Rfl: 0    senna-docusate (PERICOLACE) 8.6-50 mg per tablet, Take 1 Tab by mouth two (2) times a day. Indications: constipation, Disp: 60 Tab, Rfl: 0    warfarin (COUMADIN) 2 mg tablet, Take 5 mg by mouth daily at 6pm.  Dose may be adjusted based on twice weekly lab results. Indications: DEEP VEIN THROMBOSIS PREVENTION (Patient taking differently: Take 4 mg by mouth daily. hold coumadin x 1 day then take 2 tabs daily  Indications: DEEP VEIN THROMBOSIS PREVENTION), Disp: 90 Tab, Rfl: 1    rosuvastatin (CRESTOR) 20 mg tablet, Take 20 mg by mouth nightly., Disp: , Rfl:     PARoxetine (PAXIL) 20 mg tablet, Take 20 mg by mouth daily (after dinner). , Disp: , Rfl:     hydrochlorothiazide (HYDRODIURIL) 25 mg tablet, Take 12.5 mg by mouth daily (after dinner). , Disp: , Rfl:     All:  Allergies   Allergen Reactions    Bees [Sting, Bee] Anaphylaxis    Clindamycin Anaphylaxis       Social Hx:  Social History     Socioeconomic History    Marital status:      Spouse name: Not on file    Number of children: Not on file    Years of education: Not on file    Highest education level: Not on file   Tobacco Use    Smoking status: Former Smoker     Packs/day: 0.25     Years: 10.00     Pack years: 2.50     Last attempt to quit: 1993     Years since quittin.1    Smokeless tobacco: Never Used   Substance and Sexual Activity    Alcohol use: No    Drug use: No       Family Hx:  Family History   Problem Relation Age of Onset    Colon Polyps Father     Cancer Father         bladder    COPD Father     Hypertension Mother     Thyroid Disease Mother     Heart Disease Mother         CABG    Arthritis-osteo Mother     Cancer Mother         STOMACH    Heart Surgery Mother         CABG    Alzheimer Mother     No Known Problems Sister     No Known Problems Brother     Delayed Awakening Sister     No Known Problems Sister          Review of Systems:       General: Denies headache, lethargy, fever, weight loss  Ears/Nose/Throat: Denies ear discharge, drainage, nosebleeds, hoarse voice, dental problems  Cardiovascular: Denies chest pain, shortness of breath  Lungs: Denies chest pain, breathing problems, wheezing, pneumonia  Stomach: Denies stomach pain, heartburn, constipation, irritable bowel  Skin: Denies rash, sores, open wounds  Musculoskeletal: left knee pain  Genitourinary: Denies dysuria, hematuria, polyuria  Gastrointestinal: Denies constipation, obstipation, diarrhea  Neurological: Denies changes in sight, smell, hearing, taste, seizures. Denies loss of consciousness. Psychiatric: Denies depression, sleep pattern changes, anxiety, change in personality  Endocrine: Denies mood swings, heat or cold intolerance  Hematologic/Lymphatic: Denies anemia, purpura, petechia  Allergic/Immunologic: Denies swelling of throat, pain or swelling at lymph nodes      Physical Examination:    Visit Vitals  BP (!) 151/97 (BP 1 Location: Right arm, BP Patient Position: Sitting)   Pulse 80   Ht 5' 6\" (1.676 m)   Wt 259 lb (117.5 kg)   SpO2 97%   BMI 41.80 kg/m²        General: AOX3, no apparent distress  Psychiatric: mood and affect appropriate  Lungs: breathing is symmetric and unlabored bilaterally  Heart: regular rate and rhythm  Abdomen: no guarding  Head: normocephalic, atraumatic  Skin: No significant abnormalities, good turgor  Sensation intact to light touch: C5-T1 dermatomes  Muscular exam: 5/5 strength in all major muscle groups unless noted in specialty exam.    Extremities      Right upper extremity: Extremity is examined, no evidence of gross deformity, no gross motor or sensory deficit. Full active and passive range of motion is noted. Muscle tone and bulk is within normal expected limits. Capillary refill is less than 2 seconds distally. Left upper extremity: Extremity is examined, no evidence of gross deformity, no gross motor or sensory deficit. Full active and passive range of motion is noted.   Muscle tone and bulk is within normal expected limits. Capillary refill is less than 2 seconds distally. Right lower extremity: Extremity is examined, no evidence of gross deformity, no gross motor or sensory deficit. Full active and passive range of motion is noted. Muscle tone and bulk is within normal expected limits. Capillary refill is less than 2 seconds distally. Left lower extremity: No gross deformity. Knee indicates small effusion. Significant joint line tenderness to palpation medially, not laterally. Positive Ayan's medially, not laterally. ACL, PCL, MCL, LCL are all intact to ligamentous testing. Capillary refill is less than 2 seconds distally. Knee flexion and extension is 5 out of 5 strength. Patella tracks centrally, no patellar grind. Diagnostics:    Pertinent Diagnostics: Xrays are available of the left knee, they indicate mild osteoarthritis of the knee joint, no significant other findings, no other osseus abnormalities, fractures, or dislocations. MRI from 2019 indicates mild OA medial compartment with degenerative meniscus tear, right knee. Assessment: Left knee pain, likely secondary to medial meniscal tear    Plan: This patient has the above-mentioned issue, I have had a long discussion with them regarding the treatment options which include nonoperative and operative. Due to the length of symptoms, as well as the clinical scenario, we have agreed to proceed with nonoperative management. I have advised the patient that should they have worsening symptoms, mechanical blockage to motion, or locked knee that they should call on an emergent basis. Otherwise, I did discuss the treatment options for this particular issue which include bracing, physical therapy, anti-inflammatories and pain medications as well as activity modification.   I have also discussed the long-term effects of this particular issue which include progression of osteoarthritis faster than would normally have occurred. The patient will start PT, NSAIDS, deferred     Follow-up will be in 6 weeks, the patient does not need x-rays on follow-up. Ms. Beryle Silvius has a reminder for a \"due or due soon\" health maintenance. I have asked that she contact her primary care provider for follow-up on this health maintenance.

## 2020-02-19 NOTE — PROGRESS NOTES
Identified pt with two pt identifiers (name and ). Reviewed chart in preparation for visit and have obtained necessary documentation. Omar Aranda is a 64 y.o. female  Chief Complaint   Patient presents with    Knee Pain     LT knee pain     Visit Vitals  BP (!) 151/97 (BP 1 Location: Right arm, BP Patient Position: Sitting)   Pulse 80   Ht 5' 6\" (1.676 m)   Wt 259 lb (117.5 kg)   SpO2 97%   BMI 41.80 kg/m²     1. Have you been to the ER, urgent care clinic since your last visit? Hospitalized since your last visit? No    2. Have you seen or consulted any other health care providers outside of the [address] [place] [address] since your last visit? Include any pap smears or colon screening.  No

## 2020-03-25 ENCOUNTER — HOSPITAL ENCOUNTER (OUTPATIENT)
Dept: NUCLEAR MEDICINE | Age: 60
Discharge: HOME | End: 2020-03-25
Attending: ORTHOPAEDIC SURGERY
Payer: COMMERCIAL

## 2020-03-25 DIAGNOSIS — Z96.651 STATUS POST TOTAL RIGHT KNEE REPLACEMENT: ICD-10-CM

## 2020-03-25 DIAGNOSIS — Z96.651 PAIN DUE TO TOTAL RIGHT KNEE REPLACEMENT, SUBSEQUENT ENCOUNTER: ICD-10-CM

## 2020-03-25 DIAGNOSIS — T84.84XD PAIN DUE TO TOTAL RIGHT KNEE REPLACEMENT, SUBSEQUENT ENCOUNTER: ICD-10-CM

## 2020-03-25 PROCEDURE — A9503 TC99M MEDRONATE: HCPCS

## 2020-03-25 PROCEDURE — 78306 BONE IMAGING WHOLE BODY: CPT

## 2022-03-16 ENCOUNTER — OFFICE VISIT (OUTPATIENT)
Dept: ORTHOPEDIC SURGERY | Age: 62
End: 2022-03-16
Payer: COMMERCIAL

## 2022-03-16 VITALS
OXYGEN SATURATION: 97 % | SYSTOLIC BLOOD PRESSURE: 154 MMHG | WEIGHT: 254 LBS | TEMPERATURE: 97.6 F | HEIGHT: 66 IN | BODY MASS INDEX: 40.82 KG/M2 | DIASTOLIC BLOOD PRESSURE: 89 MMHG | HEART RATE: 84 BPM

## 2022-03-16 DIAGNOSIS — S83.232D COMPLEX TEAR OF MEDIAL MENISCUS OF LEFT KNEE AS CURRENT INJURY, SUBSEQUENT ENCOUNTER: Primary | ICD-10-CM

## 2022-03-16 PROCEDURE — 99213 OFFICE O/P EST LOW 20 MIN: CPT | Performed by: ORTHOPAEDIC SURGERY

## 2022-03-16 RX ORDER — AMLODIPINE BESYLATE 5 MG/1
TABLET ORAL
COMMUNITY
Start: 2022-02-14

## 2022-03-16 NOTE — LETTER
3/16/2022    Patient: Lily Quintero   YOB: 1960   Date of Visit: 3/16/2022     Alva Doll MD  [address]  [address] [address]  Via Fax: [phone]    Dear Alva Doll MD,      Thank you for referring Ms. Jim Dillon to [facility] for evaluation. My notes for this consultation are attached. If you have questions, please do not hesitate to call me. I look forward to following your patient along with you.       Sincerely,    Diana Cramer, DO

## 2022-03-16 NOTE — PROGRESS NOTES
Identified pt with two pt identifiers (name and ). Reviewed chart in preparation for visit and have obtained necessary documentation. Lis Song is a 63 y.o. female  Chief Complaint   Patient presents with    Knee Pain     Bilateral knee     Visit Vitals  BP (!) 154/89 (BP 1 Location: Left upper arm, BP Patient Position: Sitting, BP Cuff Size: Large adult)   Pulse 84   Temp 97.6 °F (36.4 °C) (Tympanic)   Ht 5' 6\" (1.676 m)   Wt 254 lb (115.2 kg)   SpO2 97%   BMI 41.00 kg/m²     1. Have you been to the ER, urgent care clinic since your last visit? Hospitalized since your last visit? No    2. Have you seen or consulted any other health care providers outside of the [address] [place] [address] since your last visit? Include any pap smears or colon screening.  No

## 2022-03-17 DIAGNOSIS — Z47.89 ORTHOPEDIC AFTERCARE: Primary | ICD-10-CM

## 2022-03-17 RX ORDER — ROSUVASTATIN CALCIUM 40 MG/1
40 TABLET, COATED ORAL
COMMUNITY

## 2022-03-17 NOTE — PERIOP NOTES
SW patient - she was at work and asked to be called back at 12:30p - made her aware of her Covid testing and time 3/18 - stated understanding.

## 2022-03-17 NOTE — PERIOP NOTES
[facility]  Preoperative Instructions        Surgery Date 3/22/2022     Time of Arrival To Be Called  Contact# [phone]    1. On the day of your surgery, please report to the Surgical Services Registration Desk and sign in at your designated time. The Surgery Center is located to the right of the Emergency Room. 2. You must have someone with you to drive you home. You should not drive a car for 24 hours following surgery. Please make arrangements for a friend or family member to stay with you for the first 24 hours after your surgery. 3. Do not have anything to eat or drink (including water, gum, mints, coffee, juice) after midnight 3/21/2022 . ? This may not apply to medications prescribed by your physician. ?(Please note below the special instructions with medications to take the morning of your procedure.)    4. We recommend you do not drink any alcoholic beverages for 24 hours before and after your surgery. 5. Contact your surgeons office for instructions on the following medications: non-steroidal anti-inflammatory drugs (i.e. Advil, Aleve), vitamins, and supplements. (Some surgeons will want you to stop these medications prior to surgery and others may allow you to take them)  **If you are currently taking Plavix, Coumadin, Aspirin and/or other blood-thinning agents, contact your surgeon for instructions. ** Your surgeon will partner with the physician prescribing these medications to determine if it is safe to stop or if you need to continue taking. Please do not stop taking these medications without instructions from your surgeon    6. Wear comfortable clothes. Wear glasses instead of contacts. Do not bring any money or jewelry. Please bring picture ID, insurance card, and any prearranged co-payment or hospital payment. Do not wear make-up, particularly mascara the morning of your surgery.   Do not wear nail polish, particularly if you are having foot /hand surgery. Wear your hair loose or down, no ponytails, buns, jill pins or clips. All body piercings must be removed. Please shower with antibacterial soap for three consecutive days before and on the morning of surgery, but do not apply any lotions, powders or deodorants after the shower on the day of surgery. Please use a fresh towels after each shower. Please sleep in clean clothes and change bed linens the night before surgery. Please do not shave for 48 hours prior to surgery. Shaving of the face is acceptable. 7. You should understand that if you do not follow these instructions your surgery may be cancelled. If your physical condition changes (I.e. fever, cold or flu) please contact your surgeon as soon as possible. 8. It is important that you be on time. If a situation occurs where you may be late, please call [phone] (OR Holding Area). 9. If you have any questions and or problems, please call [phone] (Pre-admission Testing). 10. Your surgery time may be subject to change. You will receive a phone call the evening prior if your time changes. 11.  If having outpatient surgery, you must have someone to drive you here, stay with you during the duration of your stay, and to drive you home at time of discharge. 12.  Due to current COVID restrictions, only ONE adult may accompany you the day of the procedure. We have limited seating available. If our waiting room is at capacity, your ride may be asked to remain in their vehicle. No children are allowed in the waiting room    Special Instructions: Follow all instructions given to you by your doctor. Covid Testing 3/18/2022-  arrive between 7am - 11:45am - [address] [facility] [address] - [address] [facility]. Follow the signs to the [facility] Covid Testing drive up under the office overhang. (used to be [facility]) We recommend you self quarantine from time of testing til day of surgery.     TAKE ALL MEDICATIONS DAY OF SURGERY EXCEPT: none       I understand a pre-operative phone call will be made to verify my surgery time. In the event that I am not available, I give permission for a message to be left on my answering service and/or with another person?   yes         ___________________      __________   _________    (Signature of Patient)             (Witness)                (Date and Time)

## 2022-03-18 ENCOUNTER — HOSPITAL ENCOUNTER (OUTPATIENT)
Dept: PREADMISSION TESTING | Age: 62
Discharge: HOME | End: 2022-03-18
Payer: COMMERCIAL

## 2022-03-18 LAB
SARS-COV-2, XPLCVT: NOT DETECTED
SOURCE, COVRS: NORMAL

## 2022-03-18 PROCEDURE — U0005 INFEC AGEN DETEC AMPLI PROBE: HCPCS

## 2022-03-18 NOTE — H&P
3/18/2022    Chief Complaint: Left knee pain    HPI: This is a 63 y.o. female who complains of left knee pain related to a meniscal tear. The patient failed non-operative management and elected for a knee arthroscopy. The patient has been medically and specialty cleared. The patient denies any numbness, weakness, tingling, fevers, chills, nausea, vomiting, fevers, chills, nausea, vomiting. PMH:  Past Medical History:   Diagnosis Date    Anemia     Arthritis     KNEE    Depression     GERD (gastroesophageal reflux disease)     hiatal hernia / reflux    Heart murmur     Hiatal hernia     High cholesterol     Hypertension     Psychiatric disorder     stress    PUD (peptic ulcer disease)        PSxHx:  Past Surgical History:   Procedure Laterality Date    HX  SECTION  2001    HX CHOLECYSTECTOMY      HX DILATION AND CURETTAGE  2009    x 2    HX GI      COLONOSCOPY    HX GI      ENDOSCOPY    HX HYSTERECTOMY  2009    PARTIAL    HX KNEE ARTHROSCOPY Right 06/15/2017    TORN MENISCUS    HX KNEE REPLACEMENT Right 2017    HX MENISCUS REPAIR Right     HX PARTIAL HYSTERECTOMY      MA COLSC FLX W/RMVL OF TUMOR POLYP LESION SNARE TQ  2012         MA EGD TRANSORAL BIOPSY SINGLE/MULTIPLE  2012         UPPER GI ENDOSCOPY,BIOPSY  2015            Meds:  No current facility-administered medications for this encounter. Current Outpatient Medications:     rosuvastatin (CRESTOR) 40 mg tablet, Take 40 mg by mouth nightly., Disp: , Rfl:     amLODIPine (NORVASC) 5 mg tablet, , Disp: , Rfl:     atorvastatin (LIPITOR) 10 mg tablet, Lipitor 10 mg tablet  Prescribed by non [facility] MD, Disp: , Rfl:     acetaminophen (TYLENOL) 500 mg tablet, Take 1 Tab by mouth every four (4) hours (while awake). Indications: Pain, Disp: 100 Tab, Rfl: 0    All:   Allergies   Allergen Reactions    Bees [Sting, Bee] Anaphylaxis    Clindamycin Anaphylaxis       Social Hx:  Social History Socioeconomic History    Marital status:    Tobacco Use    Smoking status: Former Smoker     Packs/day: 0.25     Years: 10.00     Pack years: 2.50     Quit date: 1993     Years since quittin.2    Smokeless tobacco: Never Used   Vaping Use    Vaping Use: Never used   Substance and Sexual Activity    Alcohol use: No    Drug use: No       Family Hx:  Family History   Problem Relation Age of Onset    Colon Polyps Father     Cancer Father         bladder    COPD Father     Hypertension Mother     Thyroid Disease Mother     Heart Disease Mother         CABG    OSTEOARTHRITIS Mother     Cancer Mother         STOMACH    Heart Surgery Mother         CABG    Alzheimer's Disease Mother     No Known Problems Sister     No Known Problems Brother     Delayed Awakening Sister     No Known Problems Sister          Review of Systems:       General: Denies headache, lethargy, fever, weight loss  Ears/Nose/Throat: Denies ear discharge, drainage, nosebleeds, hoarse voice, dental problems  Cardiovascular: Denies chest pain, shortness of breath  Lungs: Denies chest pain, breathing problems, wheezing, pneumonia  Stomach: Denies stomach pain, heartburn, constipation, irritable bowel  Skin: Denies rash, sores, open wounds  Musculoskeletal: left knee pain  Genitourinary: Denies dysuria, hematuria, polyuria  Gastrointestinal: Denies constipation, obstipation, diarrhea  Neurological: Denies changes in sight, smell, hearing, taste, seizures. Denies loss of consciousness.   Psychiatric: Denies depression, sleep pattern changes, anxiety, change in personality  Endocrine: Denies mood swings, heat or cold intolerance  Hematologic/Lymphatic: Denies anemia, purpura, petechia  Allergic/Immunologic: Denies swelling of throat, pain or swelling at lymph nodes      Physical Examination:    Visit Vitals  Ht 5' 6\" (1.676 m)   Wt 254 lb (115.2 kg)   BMI 41.00 kg/m²        General: AOX3, no apparent distress  Psychiatric: mood and affect appropriate  Lungs: breathing is symmetric and unlabored bilaterally  Heart: regular rate and rhythm  Abdomen: no guarding  Head: normocephalic, atraumatic  Skin: No significant abnormalities, good turgor  Sensation intact to light touch: C5-T1 dermatomes  Muscular exam: 5/5 strength in all major muscle groups unless noted in specialty exam.    Extremities      Right upper extremity: Extremity is examined, no evidence of gross deformity, no gross motor or sensory deficit. Full active and passive range of motion is noted. Muscle tone and bulk is within normal expected limits. Capillary refill is less than 2 seconds distally. Left upper extremity: Extremity is examined, no evidence of gross deformity, no gross motor or sensory deficit. Full active and passive range of motion is noted. Muscle tone and bulk is within normal expected limits. Capillary refill is less than 2 seconds distally. Right lower extremity: Extremity is examined, no evidence of gross deformity, no gross motor or sensory deficit. Full active and passive range of motion is noted. Muscle tone and bulk is within normal expected limits. Capillary refill is less than 2 seconds distally. Left lower extremity: No gross deformity. Knee indicates small effusion. Significant joint line tenderness to palpation medially, not laterally. Positive Ayan's medially, not laterally. ACL, PCL, MCL, LCL are all intact to ligamentous testing. Capillary refill is less than 2 seconds distally. Knee flexion and extension is 5 out of 5 strength. Patella tracks centrally, no patellar grind. Diagnostics:    Pertinent Diagnostics: MRI confirms left knee medial degenerative meniscal tear. Assessment: Left knee pain, medial meniscal tear    Plan: This patient has the above-mentioned issue, I have had a long discussion with them regarding the treatment options which include nonoperative and operative.   Due to the length of symptoms, as well as the clinical scenario, and failure of non-operative management, and in combination with these mechanical symptoms, we have agreed to proceed with operative management in the form of an arthroscopically assisted partial medial meniscectomy. We did discuss the risks of surgery which include but are not limited to infection, nerve or blood vessel damage, failure of fixation, failure of any possible implant, need for reoperation, postoperative pain and swelling, intra-or postoperative fracture, postoperative ligamentous pain, need for reoperation, implant failure, death, disability, organ dysfunction, wound healing issues, DVT, PE, and the need for further procedures. The patient did freely state their understanding and satisfaction with our discussion. We will proceed after medical clearances. We will proceed with an arthroscopically assisted partial medial meniscectomy of the left knee    The patient was counseled at length about the risks of patti Covid-19 during their perioperative period and any recovery window from their procedure. The patient was made aware that patti Covid-19  may worsen their prognosis for recovering from their procedure and lend to a higher morbidity and/or mortality risk. All material risks, benefits, and reasonable alternatives including postponing the procedure were discussed. The patient does  wish to proceed with the procedure at this time.

## 2022-03-19 PROBLEM — M23.204 DEGENERATIVE TEAR OF MEDIAL MENISCUS OF LEFT KNEE: Status: ACTIVE | Noted: 2020-02-19

## 2022-03-19 PROBLEM — M17.11 PRIMARY LOCALIZED OSTEOARTHRITIS OF RIGHT KNEE: Status: ACTIVE | Noted: 2017-11-21

## 2022-03-22 ENCOUNTER — HOSPITAL ENCOUNTER (OUTPATIENT)
Age: 62
Setting detail: OUTPATIENT SURGERY
Discharge: HOME | End: 2022-03-22
Attending: ORTHOPAEDIC SURGERY | Admitting: ORTHOPAEDIC SURGERY
Payer: COMMERCIAL

## 2022-03-22 ENCOUNTER — ANESTHESIA (OUTPATIENT)
Dept: SURGERY | Age: 62
End: 2022-03-22
Payer: COMMERCIAL

## 2022-03-22 ENCOUNTER — ANESTHESIA EVENT (OUTPATIENT)
Dept: SURGERY | Age: 62
End: 2022-03-22
Payer: COMMERCIAL

## 2022-03-22 VITALS
BODY MASS INDEX: 40.75 KG/M2 | RESPIRATION RATE: 15 BRPM | HEART RATE: 68 BPM | WEIGHT: 253.53 LBS | HEIGHT: 66 IN | OXYGEN SATURATION: 92 % | DIASTOLIC BLOOD PRESSURE: 75 MMHG | SYSTOLIC BLOOD PRESSURE: 142 MMHG | TEMPERATURE: 97.8 F

## 2022-03-22 DIAGNOSIS — M17.11 PRIMARY LOCALIZED OSTEOARTHRITIS OF RIGHT KNEE: ICD-10-CM

## 2022-03-22 DIAGNOSIS — M23.204 DEGENERATIVE TEAR OF MEDIAL MENISCUS OF LEFT KNEE: Primary | ICD-10-CM

## 2022-03-22 PROCEDURE — 77030040361 HC SLV COMPR DVT MDII -B: Performed by: ORTHOPAEDIC SURGERY

## 2022-03-22 PROCEDURE — 74011000250 HC RX REV CODE- 250: Performed by: NURSE ANESTHETIST, CERTIFIED REGISTERED

## 2022-03-22 PROCEDURE — 76210000016 HC OR PH I REC 1 TO 1.5 HR: Performed by: ORTHOPAEDIC SURGERY

## 2022-03-22 PROCEDURE — 77030020143 HC AIRWY LARYN INTUB CGAS -A: Performed by: STUDENT IN AN ORGANIZED HEALTH CARE EDUCATION/TRAINING PROGRAM

## 2022-03-22 PROCEDURE — 77030002916 HC SUT ETHLN J&J -A: Performed by: ORTHOPAEDIC SURGERY

## 2022-03-22 PROCEDURE — 77030008496 HC TBNG ARTHSC IRR S&N -B: Performed by: ORTHOPAEDIC SURGERY

## 2022-03-22 PROCEDURE — 74011250636 HC RX REV CODE- 250/636: Performed by: ORTHOPAEDIC SURGERY

## 2022-03-22 PROCEDURE — 76210000020 HC REC RM PH II FIRST 0.5 HR: Performed by: ORTHOPAEDIC SURGERY

## 2022-03-22 PROCEDURE — 77030000032 HC CUF TRNQT ZIMM -B: Performed by: ORTHOPAEDIC SURGERY

## 2022-03-22 PROCEDURE — 74011250636 HC RX REV CODE- 250/636: Performed by: ANESTHESIOLOGY

## 2022-03-22 PROCEDURE — 77030018834: Performed by: ORTHOPAEDIC SURGERY

## 2022-03-22 PROCEDURE — 74011250636 HC RX REV CODE- 250/636

## 2022-03-22 PROCEDURE — 74011000250 HC RX REV CODE- 250: Performed by: ORTHOPAEDIC SURGERY

## 2022-03-22 PROCEDURE — 2709999900 HC NON-CHARGEABLE SUPPLY: Performed by: ORTHOPAEDIC SURGERY

## 2022-03-22 PROCEDURE — 74011250637 HC RX REV CODE- 250/637: Performed by: ORTHOPAEDIC SURGERY

## 2022-03-22 PROCEDURE — 76060000032 HC ANESTHESIA 0.5 TO 1 HR: Performed by: ORTHOPAEDIC SURGERY

## 2022-03-22 PROCEDURE — 77030019908 HC STETH ESOPH SIMS -A: Performed by: STUDENT IN AN ORGANIZED HEALTH CARE EDUCATION/TRAINING PROGRAM

## 2022-03-22 PROCEDURE — 74011250636 HC RX REV CODE- 250/636: Performed by: NURSE ANESTHETIST, CERTIFIED REGISTERED

## 2022-03-22 PROCEDURE — 29881 ARTHRS KNE SRG MNISECTMY M/L: CPT | Performed by: ORTHOPAEDIC SURGERY

## 2022-03-22 PROCEDURE — 77030013079 HC BLNKT BAIR HGGR 3M -A: Performed by: NURSE ANESTHETIST, CERTIFIED REGISTERED

## 2022-03-22 PROCEDURE — 76010000138 HC OR TIME 0.5 TO 1 HR: Performed by: ORTHOPAEDIC SURGERY

## 2022-03-22 RX ORDER — SODIUM CHLORIDE, SODIUM LACTATE, POTASSIUM CHLORIDE, CALCIUM CHLORIDE 600; 310; 30; 20 MG/100ML; MG/100ML; MG/100ML; MG/100ML
25 INJECTION, SOLUTION INTRAVENOUS CONTINUOUS
Status: DISCONTINUED | OUTPATIENT
Start: 2022-03-22 | End: 2022-03-22 | Stop reason: HOSPADM

## 2022-03-22 RX ORDER — FENTANYL CITRATE 50 UG/ML
25 INJECTION, SOLUTION INTRAMUSCULAR; INTRAVENOUS
Status: DISCONTINUED | OUTPATIENT
Start: 2022-03-22 | End: 2022-03-22 | Stop reason: HOSPADM

## 2022-03-22 RX ORDER — LIDOCAINE HYDROCHLORIDE 10 MG/ML
0.1 INJECTION, SOLUTION EPIDURAL; INFILTRATION; INTRACAUDAL; PERINEURAL AS NEEDED
Status: DISCONTINUED | OUTPATIENT
Start: 2022-03-22 | End: 2022-03-22 | Stop reason: HOSPADM

## 2022-03-22 RX ORDER — MIDAZOLAM HYDROCHLORIDE 1 MG/ML
0.5 INJECTION, SOLUTION INTRAMUSCULAR; INTRAVENOUS
Status: DISCONTINUED | OUTPATIENT
Start: 2022-03-22 | End: 2022-03-22 | Stop reason: HOSPADM

## 2022-03-22 RX ORDER — ASPIRIN 325 MG
325 TABLET, DELAYED RELEASE (ENTERIC COATED) ORAL
Qty: 60 TABLET | Refills: 0 | Status: SHIPPED | OUTPATIENT
Start: 2022-03-22

## 2022-03-22 RX ORDER — ONDANSETRON 2 MG/ML
4 INJECTION INTRAMUSCULAR; INTRAVENOUS ONCE
Status: COMPLETED | OUTPATIENT
Start: 2022-03-22 | End: 2022-03-22

## 2022-03-22 RX ORDER — HYDROMORPHONE HYDROCHLORIDE 2 MG/ML
INJECTION, SOLUTION INTRAMUSCULAR; INTRAVENOUS; SUBCUTANEOUS AS NEEDED
Status: DISCONTINUED | OUTPATIENT
Start: 2022-03-22 | End: 2022-03-22 | Stop reason: HOSPADM

## 2022-03-22 RX ORDER — EPHEDRINE SULFATE/0.9% NACL/PF 50 MG/5 ML
SYRINGE (ML) INTRAVENOUS AS NEEDED
Status: DISCONTINUED | OUTPATIENT
Start: 2022-03-22 | End: 2022-03-22 | Stop reason: HOSPADM

## 2022-03-22 RX ORDER — ONDANSETRON 2 MG/ML
INJECTION INTRAMUSCULAR; INTRAVENOUS
Status: COMPLETED
Start: 2022-03-22 | End: 2022-03-22

## 2022-03-22 RX ORDER — MIDAZOLAM HYDROCHLORIDE 1 MG/ML
1 INJECTION, SOLUTION INTRAMUSCULAR; INTRAVENOUS AS NEEDED
Status: DISCONTINUED | OUTPATIENT
Start: 2022-03-22 | End: 2022-03-22 | Stop reason: HOSPADM

## 2022-03-22 RX ORDER — SODIUM CHLORIDE 0.9 % (FLUSH) 0.9 %
5-40 SYRINGE (ML) INJECTION AS NEEDED
Status: DISCONTINUED | OUTPATIENT
Start: 2022-03-22 | End: 2022-03-22 | Stop reason: HOSPADM

## 2022-03-22 RX ORDER — ROPIVACAINE HYDROCHLORIDE 5 MG/ML
30 INJECTION, SOLUTION EPIDURAL; INFILTRATION; PERINEURAL ONCE
Status: DISCONTINUED | OUTPATIENT
Start: 2022-03-22 | End: 2022-03-22 | Stop reason: HOSPADM

## 2022-03-22 RX ORDER — SODIUM CHLORIDE 0.9 % (FLUSH) 0.9 %
5-40 SYRINGE (ML) INJECTION EVERY 8 HOURS
Status: DISCONTINUED | OUTPATIENT
Start: 2022-03-22 | End: 2022-03-22 | Stop reason: HOSPADM

## 2022-03-22 RX ORDER — DEXMEDETOMIDINE HYDROCHLORIDE 100 UG/ML
INJECTION, SOLUTION INTRAVENOUS AS NEEDED
Status: DISCONTINUED | OUTPATIENT
Start: 2022-03-22 | End: 2022-03-22 | Stop reason: HOSPADM

## 2022-03-22 RX ORDER — SODIUM CHLORIDE, SODIUM LACTATE, POTASSIUM CHLORIDE, CALCIUM CHLORIDE 600; 310; 30; 20 MG/100ML; MG/100ML; MG/100ML; MG/100ML
75 INJECTION, SOLUTION INTRAVENOUS CONTINUOUS
Status: DISCONTINUED | OUTPATIENT
Start: 2022-03-22 | End: 2022-03-22 | Stop reason: HOSPADM

## 2022-03-22 RX ORDER — SODIUM CHLORIDE 9 MG/ML
50 INJECTION, SOLUTION INTRAVENOUS CONTINUOUS
Status: DISCONTINUED | OUTPATIENT
Start: 2022-03-22 | End: 2022-03-22 | Stop reason: HOSPADM

## 2022-03-22 RX ORDER — ACETAMINOPHEN 500 MG
1000 TABLET ORAL ONCE
Status: COMPLETED | OUTPATIENT
Start: 2022-03-22 | End: 2022-03-22

## 2022-03-22 RX ORDER — HYDROCODONE BITARTRATE AND ACETAMINOPHEN 5; 325 MG/1; MG/1
1 TABLET ORAL
Status: DISCONTINUED | OUTPATIENT
Start: 2022-03-22 | End: 2022-03-23 | Stop reason: HOSPADM

## 2022-03-22 RX ORDER — FENTANYL CITRATE 50 UG/ML
50 INJECTION, SOLUTION INTRAMUSCULAR; INTRAVENOUS AS NEEDED
Status: DISCONTINUED | OUTPATIENT
Start: 2022-03-22 | End: 2022-03-22 | Stop reason: HOSPADM

## 2022-03-22 RX ORDER — HYDROMORPHONE HYDROCHLORIDE 1 MG/ML
0.2 INJECTION, SOLUTION INTRAMUSCULAR; INTRAVENOUS; SUBCUTANEOUS
Status: DISCONTINUED | OUTPATIENT
Start: 2022-03-22 | End: 2022-03-22 | Stop reason: HOSPADM

## 2022-03-22 RX ORDER — ROPIVACAINE HYDROCHLORIDE 5 MG/ML
INJECTION, SOLUTION EPIDURAL; INFILTRATION; PERINEURAL AS NEEDED
Status: DISCONTINUED | OUTPATIENT
Start: 2022-03-22 | End: 2022-03-22 | Stop reason: HOSPADM

## 2022-03-22 RX ORDER — MORPHINE SULFATE 2 MG/ML
2 INJECTION, SOLUTION INTRAMUSCULAR; INTRAVENOUS
Status: DISCONTINUED | OUTPATIENT
Start: 2022-03-22 | End: 2022-03-22 | Stop reason: HOSPADM

## 2022-03-22 RX ORDER — HYDROCODONE BITARTRATE AND ACETAMINOPHEN 5; 325 MG/1; MG/1
1 TABLET ORAL
Qty: 18 TABLET | Refills: 0 | Status: SHIPPED | OUTPATIENT
Start: 2022-03-22 | End: 2022-03-25

## 2022-03-22 RX ORDER — ONDANSETRON 2 MG/ML
INJECTION INTRAMUSCULAR; INTRAVENOUS AS NEEDED
Status: DISCONTINUED | OUTPATIENT
Start: 2022-03-22 | End: 2022-03-22 | Stop reason: HOSPADM

## 2022-03-22 RX ORDER — FENTANYL CITRATE 50 UG/ML
INJECTION, SOLUTION INTRAMUSCULAR; INTRAVENOUS AS NEEDED
Status: DISCONTINUED | OUTPATIENT
Start: 2022-03-22 | End: 2022-03-22 | Stop reason: HOSPADM

## 2022-03-22 RX ORDER — MIDAZOLAM HYDROCHLORIDE 1 MG/ML
INJECTION, SOLUTION INTRAMUSCULAR; INTRAVENOUS AS NEEDED
Status: DISCONTINUED | OUTPATIENT
Start: 2022-03-22 | End: 2022-03-22 | Stop reason: HOSPADM

## 2022-03-22 RX ORDER — ONDANSETRON 2 MG/ML
4 INJECTION INTRAMUSCULAR; INTRAVENOUS AS NEEDED
Status: DISCONTINUED | OUTPATIENT
Start: 2022-03-22 | End: 2022-03-22 | Stop reason: HOSPADM

## 2022-03-22 RX ORDER — LIDOCAINE HYDROCHLORIDE 20 MG/ML
INJECTION, SOLUTION EPIDURAL; INFILTRATION; INTRACAUDAL; PERINEURAL AS NEEDED
Status: DISCONTINUED | OUTPATIENT
Start: 2022-03-22 | End: 2022-03-22 | Stop reason: HOSPADM

## 2022-03-22 RX ORDER — OXYCODONE AND ACETAMINOPHEN 5; 325 MG/1; MG/1
1 TABLET ORAL AS NEEDED
Status: DISCONTINUED | OUTPATIENT
Start: 2022-03-22 | End: 2022-03-22 | Stop reason: HOSPADM

## 2022-03-22 RX ORDER — PROPOFOL 10 MG/ML
INJECTION, EMULSION INTRAVENOUS AS NEEDED
Status: DISCONTINUED | OUTPATIENT
Start: 2022-03-22 | End: 2022-03-22 | Stop reason: HOSPADM

## 2022-03-22 RX ORDER — DEXAMETHASONE SODIUM PHOSPHATE 4 MG/ML
INJECTION, SOLUTION INTRA-ARTICULAR; INTRALESIONAL; INTRAMUSCULAR; INTRAVENOUS; SOFT TISSUE AS NEEDED
Status: DISCONTINUED | OUTPATIENT
Start: 2022-03-22 | End: 2022-03-22 | Stop reason: HOSPADM

## 2022-03-22 RX ORDER — DIPHENHYDRAMINE HYDROCHLORIDE 50 MG/ML
12.5 INJECTION, SOLUTION INTRAMUSCULAR; INTRAVENOUS AS NEEDED
Status: DISCONTINUED | OUTPATIENT
Start: 2022-03-22 | End: 2022-03-22 | Stop reason: HOSPADM

## 2022-03-22 RX ADMIN — HYDROMORPHONE HYDROCHLORIDE 0.5 MG: 2 INJECTION, SOLUTION INTRAMUSCULAR; INTRAVENOUS; SUBCUTANEOUS at 13:50

## 2022-03-22 RX ADMIN — ONDANSETRON 4 MG: 2 INJECTION INTRAMUSCULAR; INTRAVENOUS at 15:48

## 2022-03-22 RX ADMIN — ONDANSETRON HYDROCHLORIDE 4 MG: 2 INJECTION, SOLUTION INTRAMUSCULAR; INTRAVENOUS at 13:36

## 2022-03-22 RX ADMIN — DEXMEDETOMIDINE HYDROCHLORIDE 10 MCG: 100 INJECTION, SOLUTION, CONCENTRATE INTRAVENOUS at 13:16

## 2022-03-22 RX ADMIN — PROPOFOL 160 MG: 10 INJECTION, EMULSION INTRAVENOUS at 13:16

## 2022-03-22 RX ADMIN — Medication 10 MG: at 13:29

## 2022-03-22 RX ADMIN — LIDOCAINE HYDROCHLORIDE 100 MG: 20 INJECTION, SOLUTION INTRAVENOUS at 13:16

## 2022-03-22 RX ADMIN — DEXAMETHASONE SODIUM PHOSPHATE 8 MG: 4 INJECTION, SOLUTION INTRAMUSCULAR; INTRAVENOUS at 13:15

## 2022-03-22 RX ADMIN — DEXMEDETOMIDINE HYDROCHLORIDE 2 MCG: 100 INJECTION, SOLUTION, CONCENTRATE INTRAVENOUS at 14:11

## 2022-03-22 RX ADMIN — Medication 1000 MG: at 09:26

## 2022-03-22 RX ADMIN — FENTANYL CITRATE 100 MCG: 50 INJECTION, SOLUTION INTRAMUSCULAR; INTRAVENOUS at 13:16

## 2022-03-22 RX ADMIN — HYDROCODONE BITARTRATE AND ACETAMINOPHEN 1 TABLET: 5; 325 TABLET ORAL at 15:10

## 2022-03-22 RX ADMIN — DEXMEDETOMIDINE HYDROCHLORIDE 2 MCG: 100 INJECTION, SOLUTION, CONCENTRATE INTRAVENOUS at 14:07

## 2022-03-22 RX ADMIN — Medication 3 AMPULE: at 09:26

## 2022-03-22 RX ADMIN — DEXMEDETOMIDINE HYDROCHLORIDE 6 MCG: 100 INJECTION, SOLUTION, CONCENTRATE INTRAVENOUS at 13:46

## 2022-03-22 RX ADMIN — ONDANSETRON HYDROCHLORIDE 4 MG: 2 INJECTION, SOLUTION INTRAMUSCULAR; INTRAVENOUS at 15:48

## 2022-03-22 RX ADMIN — MIDAZOLAM HYDROCHLORIDE 2 MG: 1 INJECTION, SOLUTION INTRAMUSCULAR; INTRAVENOUS at 13:11

## 2022-03-22 RX ADMIN — SODIUM CHLORIDE, POTASSIUM CHLORIDE, SODIUM LACTATE AND CALCIUM CHLORIDE 75 ML/HR: 600; 310; 30; 20 INJECTION, SOLUTION INTRAVENOUS at 09:43

## 2022-03-22 RX ADMIN — WATER 2 G: 1 INJECTION INTRAMUSCULAR; INTRAVENOUS; SUBCUTANEOUS at 13:20

## 2022-03-22 NOTE — PERIOP NOTES
Handoff Report from Operating Room to PACU    Report received from [facility] and [address] regarding Amelia Coreas. Surgeon(s):  Amanda Almeida DO  And Procedure(s) (LRB):  LEFT KNEE ARTHROSCOPY PARTIAL MEDIAL MENISCECTOMY (Left)  confirmed   with allergies and dressings discussed. Anesthesia type, drugs, patient history, complications, estimated blood loss, vital signs, intake and output, and last pain medication, lines, reversal medications and temperature were reviewed. [id number]- Patient dressed and transferred to wheelchair. Patient  C/O slight nausea when standing to get in wheelchair. Zofran given    1605- Nausea resolved. Discharge instructions given and reviewed with patient's  by Eleno Gibson. PIV and telemetry removed. Patient transported to surgical entrance with all belongings via wheelchair where  awaiting to pick patient up.

## 2022-03-22 NOTE — DISCHARGE INSTRUCTIONS
Knee Arthroscopy: If you have specific questions: CALL OUR OFFICE: [phone]    DRESSING:  Keep dressing on, clean, and dry for three days. Three days after your surgery, you may remove the dressing and wash leg with soap and water. You are not allowed to place this underwater, and once it is cleaned, you should put an occlusive dressing over the wound. ACTIVITY:  Keep the operative knee elevated as much as possible for the next two weeks until your swelling diminishes. You have been provided crutches for safety. While you will likely encounter some discomfort, the main structure of your knee is unchanged, and you can bear weight to your tolerance on this leg without restriction. You should not operate heavy machinery, drive, do heavy lifting, squatting, or kneeling until otherwise instructed. Once you are confident and safe off of crutches, you may begin to walk with a cane or no device, but this should be only when you are safe to walk and confident with the strength of your leg. It is important to begin gentle range of motion activities to your tolerance as soon as possible. GENERAL PAIN CONTROL:    IF YOU HAVE HAD A NERVE BLOCK: remember, you will have an increase in pain sometime in the first 24 hours after your surgery. This can be significant, make sure you are consistently medicating and expect that increase in pain. This is normal, but will require you to be proactive in your pain control. If this increase in pain persists - call Dr. Olaf Bai office. Take pain medications as needed and prescribed. Never exceed the maximum dosage. It is reasonable to take something for pain prior to night time on the first night to avoid severe pain in the night. Ice is an important part of your recovery, and best if you do not apply ice or ice pack directly to skin, but use a towel or cloth on your skin.   Do this for twenty minutes on the skin, then at least twenty minutes off of your skin. General Postoperative Instructions: If you have specific questions: CALL OUR OFFICE: [phone]    Diet: It is OK to resume your regular diet postoperatively. Start with light liquids and then slowly increase what you eat to avoid postoperative nausea and vomiting. If you do experience nausea, restrict fatty or greasy foods until this passes. If you have continued issues, please call our office number for help: [phone]. Dressings: In general, dressings should remain dry and clean. In many cases, these should stay on until the postoperative visit, unless you've been directed to do so otherwise. Additionally, if the dressing becomes soiled or bloody, please call our office for further instructions. Ice instructions:  Ice is important for pain relief, but can be dangerous if improperly applied. Always make sure that NO ice has direct contact with the skin, and make sure that it never stays on the affected area more than 20 minutes. It is important to have ice off of the area for more than 20 minutes after a session to allow the skin to normalize temperature. It is ok to ice through casts, splints, and dressings, and can still be helpful. Weight bearing: Please refer to the specific surgical weight bearing instructions. Please do not exceed the weight limits as prescribed, as this could cause a surgical complication, wound healing problem, or other issue that could otherwise be avoided. Warning signs: The patient should call Dr. Webber Primes office or go to an emergency department if they note a fever over 101.1 degrees fahrenheit, more or unrelieved pain, more or new swelling at the operative site, discoloration of the extremity any drainage from the wound. Patient Education        Knee Arthroscopy: What to Expect at Home  Your Recovery     Arthroscopy is a way to find problems and do surgery inside a joint without making a large cut (incision).  Your doctor put a lighted tube with a tiny camera--called an arthroscope, or scope--and surgical tools through small incisions in your knee. You will feel tired for several days. Your knee will be swollen. And you may notice that your skin is a different color near the cuts (incisions). The swelling is normal and will start to go away in a few days. Keeping your leg higher than your heart will help with swelling and pain. You will probably need about 6 weeks to recover. If your doctor repaired damaged tissue, recovery will take longer. You may have to limit your activity until your knee strength and movement are back to normal. You may also be in a physical rehabilitation (rehab) program.  You may be able to return to a desk job or your normal routine in a few days. But if you do physical labor, it may be a few weeks to a few months before you can go back to work. This care sheet gives you a general idea about how long it will take for you to recover. But each person recovers at a different pace. Follow the steps below to get better as quickly as possible. How can you care for yourself at home? Activity    · Rest when you feel tired. Getting enough sleep will help you recover. Use pillows to raise your ankle and leg above the level of your heart.     · Try to walk each day, after your doctor has said you can. Start by walking a little more than you did the day before. Bit by bit, increase the amount you walk. Walking boosts blood flow and helps prevent pneumonia and constipation.     · You may have a brace or crutches or both.     · Your doctor will tell you how often and how much you can move your leg and knee.     · If you have a desk job, you may be able to return to work a few days after the surgery.  If you lift things or stand or walk a lot at work, it may take a few weeks to a few months before you can return.     · You can take a shower 48 to 72 hours after surgery and clean the incisions with regular soap and water. Do not take a bath or soak your knee until your doctor says it is okay.     · Ask your doctor when you can drive again.     · If you had a repair of torn tissue, follow your doctor's instructions for lifting things or moving your knee. Diet    · You can eat your normal diet. If your stomach is upset, try bland, low-fat foods like plain rice, broiled chicken, toast, and yogurt.     · Drink plenty of fluids, unless your doctor tells you not to.     · You may notice that your bowel movements are not regular right after your surgery. This is common. Try to avoid constipation and straining with bowel movements. You may want to take a fiber supplement every day. If you have not had a bowel movement after a couple of days, ask your doctor about taking a mild laxative. Medicines    · Your doctor will tell you if and when you can restart your medicines. You will also get instructions about taking any new medicines.     · If you take aspirin or some other blood thinner, ask your doctor if and when to start taking it again. Make sure that you understand exactly what your doctor wants you to do.     · Be safe with medicines. Take pain medicines exactly as directed. ? If the doctor gave you a prescription medicine for pain, take it as prescribed. ? If you are not taking a prescription pain medicine, ask your doctor if you can take an over-the-counter medicine.     · If you think your pain medicine is making you sick to your stomach:  ? Take your medicine after meals (unless your doctor has told you not to). ? Ask your doctor for a different pain medicine.     · If your doctor prescribed antibiotics, take them as directed. Do not stop taking them just because you feel better. You need to take the full course of antibiotics. Incision care    · If you have a dressing over your cuts (incisions), keep it clean and dry.  You may remove it 48 to 72 hours after the surgery.     · If your incisions are open to the air, keep the area clean and dry.     · If you have strips of tape on the incisions, leave the tape on for a week or until it falls off. Exercise    · Move your toes and ankle as much as your bandages will allow.     · Bend and straighten your knee slowly several times during the day.     · Depending on why you had the surgery, you may have to do ankle and leg exercises. Your doctor or physical therapist will give you exercises as part of a rehabilitation program.     · Stop any activity that causes sharp pain. Talk to your doctor or physical therapist about what sports or other exercise you can do. Ice    · To reduce swelling and pain, put ice or a cold pack on your knee for 10 to 20 minutes at a time. Do this every 1 to 2 hours. Put a thin cloth between the ice and your skin. If your doctor recommended cold therapy using a portable machine, follow the directions that came with the machine. Follow-up care is a key part of your treatment and safety. Be sure to make and go to all appointments, and call your doctor if you are having problems. It's also a good idea to know your test results and keep a list of the medicines you take. When should you call for help? Call 911 anytime you think you may need emergency care. For example, call if:    · You passed out (lost consciousness).     · You have severe trouble breathing.     · You have sudden chest pain and shortness of breath, or you cough up blood. Call your doctor now or seek immediate medical care if:    · Your foot or toes are numb or tingling.     · Your foot is cool or pale, or it changes color.     · You have signs of a blood clot, such as:  ? Pain in your calf, back of the knee, thigh, or groin. ?  Redness and swelling in your leg or groin.     · You are sick to your stomach or cannot keep fluids down.     · You have pain that does not get better after you take pain medicine.     · You have loose stitches, or your incision comes open.     · Bright red blood has soaked through the bandage over your incision.     · You have signs of infection, such as:  ? Increased pain, swelling, warmth, or redness. ? Red streaks leading from the incisions. ? Pus draining from the incisions. ? A fever. Watch closely for any changes in your health, and be sure to contact your doctor if:    · You do not have a bowel movement after taking a laxative. Where can you learn more? Go to http://www.gray.com/  Enter M419 in the search box to learn more about \"Knee Arthroscopy: What to Expect at Home. \"  Current as of: July 1, 2021               Content Version: 13.2  © 2006-2022 [employer]. Care instructions adapted under license by [employer] (which disclaims liability or warranty for this information). If you have questions about a medical condition or this instruction, always ask your healthcare professional. [place] [address] any warranty or liability for your use of this information. TO PREVENT AN INFECTION      1. [address] YOUR HANDS     To prevent infection, good handwashing is the most important thing you or your caregiver can do.  Wash your hands with soap and water or use the hand  we gave you before you touch any wounds. 2. SHOWER     Use the antibacterial soap we gave you when you take a shower.  Shower with this soap until your wounds are healed.  To reach all areas of your body, you may need someone to help you.  Dont forget to clean your belly button with every shower. 3.  USE CLEAN SHEETS     Use freshly cleaned sheets on your bed after surgery.  To keep the surgery site clean, do not allow pets to sleep with you while your wound is still healing.          DISCHARGE SUMMARY from Nurse    PATIENT INSTRUCTIONS:    After general anesthesia or intravenous sedation, for 24 hours or while taking prescription Narcotics:  · Limit your activities  · Do not drive and operate hazardous machinery  · Do not make important personal or business decisions  · Do  not drink alcoholic beverages  · If you have not urinated within 8 hours after discharge, please contact your surgeon on call. Report the following to your surgeon:  · Excessive pain, swelling, redness or odor of or around the surgical area  · Temperature over 100.5  · Nausea and vomiting lasting longer than 4 hours or if unable to take medications  · Any signs of decreased circulation or nerve impairment to extremity: change in color, persistent  numbness, tingling, coldness or increase pain  · Any questions        Patient Education      Narcotic-Analgesic/Acetaminophen ([address], Christos Jett, Lorcet HD, Lortab 10/325) - (By mouth)   Why this medicine is used:   Relieves pain. Contact a nurse or doctor right away if you have:  · Extreme weakness, shallow breathing, slow heartbeat  · Severe confusion, lightheadedness, dizziness, fainting  · Yellow skin or eyes, dark urine or pale stools  · Severe constipation, severe stomach pain, nausea, vomiting, loss of appetite  · Sweating or cold, clammy skin     Common side effects:  · Mild constipation, nausea, vomiting  · Sleepiness, tiredness  · Itching, rash  © 2017 [employer] Information is for End User's use only and may not be sold, redistributed or otherwise used for commercial purposes. Patient Education      Aspirin (Lorraine Extra Strength, Lorraine Aspirin Children's, Bufferin, Bufferin Low Dose) - (By mouth)   Why this medicine is used:   Treats pain, fever, and inflammation. May also reduce the risk of heart attack.   Contact a nurse or doctor right away if you have:  · Bloody vomit or vomit that looks like coffee grounds  · Blood in urine or bloody or black, tarry stools  · Wheezing or trouble breathing     Common side effects:  · Upset stomach  © 2017 [employer] Information is for End User's use only and may not be sold, redistributed or otherwise used for commercial purposes.

## 2022-03-22 NOTE — ANESTHESIA POSTPROCEDURE EVALUATION
Procedure(s):  LEFT KNEE ARTHROSCOPY PARTIAL MEDIAL MENISCECTOMY. general    Anesthesia Post Evaluation      Multimodal analgesia: multimodal analgesia used between 6 hours prior to anesthesia start to PACU discharge  Patient location during evaluation: PACU  Patient participation: complete - patient participated  Level of consciousness: awake and alert  Pain management: adequate  Airway patency: patent  Anesthetic complications: no  Cardiovascular status: acceptable, hemodynamically stable and blood pressure returned to baseline  Respiratory status: acceptable and room air  Hydration status: euvolemic  Post anesthesia nausea and vomiting:  none  Final Post Anesthesia Temperature Assessment:  Normothermia (36.0-37.5 degrees C)      INITIAL Post-op Vital signs:   Vitals Value Taken Time   /79 03/22/22 1515   Temp 36.8 °C (98.3 °F) 03/22/22 1415   Pulse 72 03/22/22 1517   Resp 16 03/22/22 1517   SpO2 92 % 03/22/22 1517   Vitals shown include unvalidated device data.

## 2022-03-22 NOTE — DISCHARGE SUMMARY
Date of Admission: 3/22/2022  Date of Discharge: 3/22/2022  Attending on admission and discharge: Dr. Zayra Guerra    Admitting Diagnosis: Left knee medial meniscal tear  Discharge Diagnosis: same  Procedure Performed: Left knee arthroscopy, partial medial meniscectomy    Hospital Course and Treatment:     The patient was admitted through the operating room for a same day surgery. The patient tolerated the procedure well and was taken to the recovery floor for further observation and treatment. The patient was maintained on IV fluids until tolerating a PO diet. They were also maintained on sequential compression devices and DVT prophylaxis. The diet was advanced as tolerated. The patient was mobilized  without issue. There were no complications during the course of the hospital stay, and the patient was deemed medically stable for discharge to home. Discharge instructions: The patient should not be in a pool or tub, or otherwise immerse the wound in water. The patient has been counseled on the importance of mobilizing and moving at least every hour to help prevent blood clots. The patient should call Dr. Gary Ren office or go to an emergency department if they note a fever over 101.1 degrees fahrenheit, more or unrelieved pain, more or new swelling at the operative site, or any drainage from the wound. Condition at Discharge: Stable    Discharge medications:  Resume regular home medications  Additional medications to be prescribed on discharge  norco  Aspirin        Follow up instructions: The patient should follow up in 2 weeks for a wound check and xrays with Dr. Arnold Pickard.

## 2022-03-22 NOTE — ANESTHESIA PREPROCEDURE EVALUATION
Anesthetic History   No history of anesthetic complications            Review of Systems / Medical History  Patient summary reviewed, nursing notes reviewed and pertinent labs reviewed    Pulmonary  Within defined limits                 Neuro/Psych         Psychiatric history (depression)     Cardiovascular    Hypertension: well controlled          Hyperlipidemia    Exercise tolerance: >4 METS     GI/Hepatic/Renal     GERD           Endo/Other        Morbid obesity, arthritis and anemia  Pertinent negatives: No diabetes   Other Findings              Physical Exam    Airway  Mallampati: II  TM Distance: > 6 cm  Neck ROM: normal range of motion   Mouth opening: Normal     Cardiovascular  Regular rate and rhythm,  S1 and S2 normal,  no murmur, click, rub, or gallop             Dental  No notable dental hx       Pulmonary  Breath sounds clear to auscultation               Abdominal  GI exam deferred       Other Findings            Anesthetic Plan    ASA: 2  Anesthesia type: general    Monitoring Plan: BIS      Induction: Intravenous  Anesthetic plan and risks discussed with: Patient

## 2022-03-23 NOTE — OP NOTES
Date of Procedure: 3/22/2022    Preoperative Diagnosis: Degenerative, Complex Medial Meniscal Tear, Left Knee  Postoperative Diagnosis: Degenerative, Complex Medial Meniscal Tear, Left Knee  Procedure Performed: Arthroscopically assisted partial Medial Meniscectomy, Left Knee  Surgeon: Sivakumar Goyal D.O. Assistant: none  Anesthesia: General, LMA  Estimated Blood loss: 5 cc  Complications: None  Drains: None  Specimens: None  Implants: None    Indications: This is a(n) 63 y.o. female who did have knee pain and associated mechanical symptoms which were associated with a meniscal tear. The patient did fail nonoperative measures and requested surgical intervention. We did discuss the risks of surgery which include, but are not limited to, infection, nerve or blood vessel damage, death, disability, progression of arthritis, persistent knee pain, intra or postoperative fracture, ligament injury, stiffness, chondral injury, DVT, PE, wound healing issues, organ dysfunction. The patient did freely consent for surgery. Description of Procedure in Detail:     After the correct site and side were identified by myself in the holding area, the patient was transported to the surgical suite. Then, after successful induction of anesthesia, the left leg was prepped and draped in the usual sterile orthopaedic fashion. After an appropriate time out, and confirmation that antibiotics had been infused within one hour of incision time, the leg was exsanguinated with an Esmarch bandage, and tourniquet taken to 250 mmHg. A standard anterolateral portal was created with a stab incision lateral to the patellar tendon and inferior to the patella. A blunt trochar was then inserted into the suprapatellar pouch and camera inserted. The joint was infused with saline and with that, a standard anteromedial portal was created under direct visualization, spinal needle insertion, and blunt trochar insertion.   A diagnostic inspection of the joint followed. Positive findings were a degenerative medial meniscal tear, grade III and IV changes trochlea of femur, grade III and IV changes medial weight bearing femoral condyle, grade II changes medial tibial plateau. Otherwise, the suprapatellar pouch, medial and lateral gutters, patellar cartilage, trochlear cartilage, medial and lateral femoral condyles, medial and lateral tibial plateaus, ACL, and remainder of the menisci were unremarkable. After joint inspection, a combination of biters and lisbeth were used to trim the medial meniscal tear back to a stable edge. Care was taken to take only what was necessary to ensure relief of the mechanical symptoms, and leave as much healthy tissue as possible. Once the tear was excised, the meniscus was reinspected and found to be satisfactory. With that, final pictures were taken, final inspection was satisfactory. Tourniquet was then released. The camera was returned to the suprapatellar pouch, excess saline was removed. The soft tissues were infused with 0.5% ropivicaine and the joint infused with 5mg of Duramorph. The skin was closed with 3-0 nylon sutures. A sterile dressing and ice were applied. The patient was then successfully awoken and taken to PACU in stable condition without apparent complication. Postoperative plan: The patient will be weight bearing as tolerated without restriction. They will participate in physical therapy for range of motion and decreased swelling. They will have ecotrin for DVT prophylaxis, Percocet for pain management, and colace for bowel maintenance. They will be seen at 2 weeks then six weeks in clinic for the standard postoperative protocol.

## 2022-04-01 ENCOUNTER — TELEPHONE (OUTPATIENT)
Dept: ORTHOPEDIC SURGERY | Age: 62
End: 2022-04-01

## 2022-04-01 NOTE — TELEPHONE ENCOUNTER
Spoke with pt and answered all her questions.  Did advise that I know PT can be expensive so she would need to get some at home exercises to do and she would be fine not going to PT.

## 2022-04-01 NOTE — TELEPHONE ENCOUNTER
PT called with a few questions:    1. Is okay if she continues to do her Physical Therapy exercises at home? Her insurance deductible just reset and she cannot afford the co-pays. 2. She has a post op appointment on 4/5/22 she would like to know if her stitches will be removed at this appointment. 3. When will the doctor most likely release her to return to work?

## 2022-04-05 ENCOUNTER — TELEPHONE (OUTPATIENT)
Dept: ORTHOPEDIC SURGERY | Age: 62
End: 2022-04-05

## 2022-04-06 ENCOUNTER — OFFICE VISIT (OUTPATIENT)
Dept: ORTHOPEDIC SURGERY | Age: 62
End: 2022-04-06
Payer: COMMERCIAL

## 2022-04-06 VITALS
WEIGHT: 256 LBS | BODY MASS INDEX: 41.14 KG/M2 | HEART RATE: 92 BPM | HEIGHT: 66 IN | OXYGEN SATURATION: 96 % | DIASTOLIC BLOOD PRESSURE: 94 MMHG | TEMPERATURE: 97.8 F | SYSTOLIC BLOOD PRESSURE: 146 MMHG

## 2022-04-06 DIAGNOSIS — Z47.89 ORTHOPEDIC AFTERCARE: ICD-10-CM

## 2022-04-06 DIAGNOSIS — S83.232D COMPLEX TEAR OF MEDIAL MENISCUS OF LEFT KNEE AS CURRENT INJURY, SUBSEQUENT ENCOUNTER: Primary | ICD-10-CM

## 2022-04-06 PROCEDURE — 99024 POSTOP FOLLOW-UP VISIT: CPT | Performed by: ORTHOPAEDIC SURGERY

## 2022-04-06 NOTE — LETTER
4/6/2022 2:28 PM    Ms. Judi Blair  [address] [place] [place] [id number]      To whom it may concern,         Judi Blair is under the care of [address] [place] [address]. Judi Blair will be released to work on 4/11/22 with the following restrictions: none. If you have any questions or concerns, please feel free to contact my office.                   Sincerely,      Brooke Lackey, DO

## 2022-04-06 NOTE — PROGRESS NOTES
is here for a follow up visit from a left knee arthroscopy, partial medial meniscectomy. Pain has been appropriate since surgery, no major medical complications since surgery. Current Outpatient Medications on File Prior to Visit   Medication Sig Dispense Refill    aspirin delayed-release 325 mg tablet Take 1 Tablet by mouth every six (6) hours as needed for Pain. 60 Tablet 0    rosuvastatin (CRESTOR) 40 mg tablet Take 40 mg by mouth nightly.  amLODIPine (NORVASC) 5 mg tablet       acetaminophen (TYLENOL) 500 mg tablet Take 1 Tab by mouth every four (4) hours (while awake). Indications: Pain 100 Tab 0     No current facility-administered medications on file prior to visit. ROS:  General: denies agitation, major chest pain, unexpected weakness  Patient states no major issues  Skin: healing wound is without issue or drainage   Strength: appropriate weakness of involved extremity is resolving since surgery      Physical Examination:    Blood pressure (!) 146/94, pulse 92, temperature 97.8 °F (36.6 °C), temperature source Tympanic, height 5' 6\" (1.676 m), weight 256 lb (116.1 kg), SpO2 96 %. Dressing: none  Skin: clean, dry, intact  Sensation intact to light touch at level of wound and distally  Strength is 5/5 knee extension and flexion  Range of motion is minimally limited at the end of range of motion.   Distal swelling is noted, but appropriate for postoperative course  Distal capillary refill less than 2 seconds      Imaging:    Postoperative imaging: none      Assessment: Status post left knee arthroscopy, partial medial meniscectomy    Plan:  Patient will continue physical therapy  Wound care was reviewed  Weightbearing will be as tolerated through the left leg  Deep Venous Thrombosis Prophylaxis: none    Follow up will be at 4 weeks

## 2022-04-06 NOTE — PROGRESS NOTES
Identified pt with two pt identifiers (name and ). Reviewed chart in preparation for visit and have obtained necessary documentation. Carla Garcia is a 63 y.o. female  Chief Complaint   Patient presents with    Surgical Follow-up     LT knee     Visit Vitals  BP (!) 146/94 (BP 1 Location: Right arm, BP Patient Position: Sitting, BP Cuff Size: Large adult)   Pulse 92   Temp 97.8 °F (36.6 °C) (Tympanic)   Ht 5' 6\" (1.676 m)   Wt 256 lb (116.1 kg)   SpO2 96%   BMI 41.32 kg/m²     1. Have you been to the ER, urgent care clinic since your last visit? Hospitalized since your last visit? No    2. Have you seen or consulted any other health care providers outside of the [address] [place] [address] since your last visit? Include any pap smears or colon screening.  No

## 2022-04-08 ENCOUNTER — TELEPHONE (OUTPATIENT)
Dept: ORTHOPEDIC SURGERY | Age: 62
End: 2022-04-08

## 2022-04-08 NOTE — TELEPHONE ENCOUNTER
I returned the PT's phone call and LVM informing her she or her  can drop off the paperwork she discussed in her VM on Monday 4/11/22 between 8:00 am and 5:00pm.

## 2022-05-06 ENCOUNTER — OFFICE VISIT (OUTPATIENT)
Dept: ORTHOPEDIC SURGERY | Age: 62
End: 2022-05-06
Payer: COMMERCIAL

## 2022-05-06 VITALS
HEIGHT: 66 IN | BODY MASS INDEX: 41.14 KG/M2 | SYSTOLIC BLOOD PRESSURE: 159 MMHG | DIASTOLIC BLOOD PRESSURE: 92 MMHG | OXYGEN SATURATION: 99 % | WEIGHT: 256 LBS | HEART RATE: 75 BPM | TEMPERATURE: 96.1 F

## 2022-05-06 DIAGNOSIS — S83.232D COMPLEX TEAR OF MEDIAL MENISCUS OF LEFT KNEE AS CURRENT INJURY, SUBSEQUENT ENCOUNTER: Primary | ICD-10-CM

## 2022-05-06 DIAGNOSIS — Z47.89 ORTHOPEDIC AFTERCARE: ICD-10-CM

## 2022-05-06 PROCEDURE — 99024 POSTOP FOLLOW-UP VISIT: CPT | Performed by: ORTHOPAEDIC SURGERY

## 2022-05-06 NOTE — PROGRESS NOTES
Identified pt with two pt identifiers (name and ). Reviewed chart in preparation for visit and have obtained necessary documentation. Jayna Dalton is a 63 y.o. female  Chief Complaint   Patient presents with    Surgical Follow-up     LT Knee      Visit Vitals  BP (!) 159/92 (BP 1 Location: Right arm, BP Patient Position: Sitting, BP Cuff Size: Large adult)   Pulse 75   Temp (!) 96.1 °F (35.6 °C) (Tympanic)   Ht 5' 6\" (1.676 m)   Wt 256 lb (116.1 kg)   SpO2 99%   BMI 41.32 kg/m²     1. Have you been to the ER, urgent care clinic since your last visit? Hospitalized since your last visit? No    2. Have you seen or consulted any other health care providers outside of the [address] [place] [address] since your last visit? Include any pap smears or colon screening.  No

## 2022-05-07 NOTE — PROGRESS NOTES
is here for a follow up visit from a left knee arthroscopy, partial medial meniscectomy. Pain has been appropriate since surgery, no major medical complications since surgery. Current Outpatient Medications on File Prior to Visit   Medication Sig Dispense Refill    aspirin delayed-release 325 mg tablet Take 1 Tablet by mouth every six (6) hours as needed for Pain. 60 Tablet 0    rosuvastatin (CRESTOR) 40 mg tablet Take 40 mg by mouth nightly.  amLODIPine (NORVASC) 5 mg tablet       acetaminophen (TYLENOL) 500 mg tablet Take 1 Tab by mouth every four (4) hours (while awake). Indications: Pain 100 Tab 0     No current facility-administered medications on file prior to visit. ROS:  General: denies agitation, major chest pain, unexpected weakness  Patient states   Skin: healing wound is without issue or drainage   Strength: appropriate weakness of involved extremity is resolving since surgery      Physical Examination:    Blood pressure (!) 159/92, pulse 75, temperature (!) 96.1 °F (35.6 °C), temperature source Tympanic, height 5' 6\" (1.676 m), weight 256 lb (116.1 kg), SpO2 99 %. Dressing: none  Skin: clean, dry, intact  Sensation intact to light touch at level of wound and distally  Strength is 5/5 knee extension and flexion  Range of motion is minimally limited at the end of range of motion.   Distal swelling is noted, but appropriate for postoperative course  Distal capillary refill less than 2 seconds      Imaging:    Postoperative imaging: none      Assessment: Status post left knee arthroscopy, partial medial meniscectomy    Plan:  Patient will continue physical therapy  Wound care was reviewed  Weightbearing will be as tolerated through the left leg  Deep Venous Thrombosis Prophylaxis: none    Follow up will be prn

## 2022-09-23 ENCOUNTER — OFFICE VISIT (OUTPATIENT)
Dept: ORTHOPEDIC SURGERY | Age: 62
End: 2022-09-23
Payer: COMMERCIAL

## 2022-09-23 ENCOUNTER — TELEPHONE (OUTPATIENT)
Dept: ORTHOPEDIC SURGERY | Age: 62
End: 2022-09-23

## 2022-09-23 VITALS
DIASTOLIC BLOOD PRESSURE: 91 MMHG | SYSTOLIC BLOOD PRESSURE: 158 MMHG | HEART RATE: 79 BPM | OXYGEN SATURATION: 97 % | HEIGHT: 66 IN | TEMPERATURE: 97 F | WEIGHT: 255 LBS | BODY MASS INDEX: 40.98 KG/M2

## 2022-09-23 DIAGNOSIS — M17.12 UNILATERAL PRIMARY OSTEOARTHRITIS, LEFT KNEE: ICD-10-CM

## 2022-09-23 DIAGNOSIS — Z47.89 ORTHOPEDIC AFTERCARE: ICD-10-CM

## 2022-09-23 DIAGNOSIS — S83.232D COMPLEX TEAR OF MEDIAL MENISCUS OF LEFT KNEE AS CURRENT INJURY, SUBSEQUENT ENCOUNTER: Primary | ICD-10-CM

## 2022-09-23 PROCEDURE — 20610 DRAIN/INJ JOINT/BURSA W/O US: CPT | Performed by: ORTHOPAEDIC SURGERY

## 2022-09-23 RX ORDER — BETAMETHASONE SODIUM PHOSPHATE AND BETAMETHASONE ACETATE 3; 3 MG/ML; MG/ML
6 INJECTION, SUSPENSION INTRA-ARTICULAR; INTRALESIONAL; INTRAMUSCULAR; SOFT TISSUE ONCE
Status: COMPLETED | OUTPATIENT
Start: 2022-09-23 | End: 2022-09-23

## 2022-09-23 RX ORDER — DICLOFENAC SODIUM 75 MG/1
75 TABLET, DELAYED RELEASE ORAL 2 TIMES DAILY
Qty: 60 TABLET | Refills: 0 | Status: SHIPPED | OUTPATIENT
Start: 2022-09-23

## 2022-09-23 RX ADMIN — BETAMETHASONE SODIUM PHOSPHATE AND BETAMETHASONE ACETATE 6 MG: 3; 3 INJECTION, SUSPENSION INTRA-ARTICULAR; INTRALESIONAL; INTRAMUSCULAR; SOFT TISSUE at 07:38

## 2022-09-23 NOTE — PROGRESS NOTES
Identified pt with two pt identifiers (name and ). Reviewed chart in preparation for visit and have obtained necessary documentation. Olinda Randolph is a 63 y.o. female  Chief Complaint   Patient presents with    Follow-up     LT Knee     Visit Vitals  BP (!) 158/91 (BP 1 Location: Right arm, BP Patient Position: Sitting, BP Cuff Size: Large adult)   Pulse 79   Temp 97 °F (36.1 °C) (Tympanic)   Ht 5' 6\" (1.676 m)   Wt 255 lb (115.7 kg)   SpO2 97%   BMI 41.16 kg/m²     1. Have you been to the ER, urgent care clinic since your last visit? Hospitalized since your last visit? No    2. Have you seen or consulted any other health care providers outside of the [address] [place] [address] since your last visit? Include any pap smears or colon screening.  No

## 2022-09-23 NOTE — TELEPHONE ENCOUNTER
Patient is requesting a [employer] Parking Placard. She would like a phone call when the form is ready to be picked up.

## 2022-09-23 NOTE — PROGRESS NOTES
2022    Chief Complaint: Left knee pain    Assessment: Osteoarthritis left knee    Plan: This patient and I did discuss the many options in treating knee osteoarthritis. We did discuss that we could continue to seek out nonoperative modalities, such as: NSAIDs, oral and topical analgesics, knee injections, knee braces, physical therapy, stretching, strengthening, and weight loss strategies, activity modification, ambulatory assistive devices. The patient stated their understanding with this, and would like to proceed with nonsurgical management in the form of injection, diclofenac. HPI: This is a 63 y.o. female who complains of left knee pain. Onset was gradual.  The patient has had activity dependent pain for years, worse in the past few months. The patient has tried activity modification, physical therapy exercises, injections have not been done. The pain is in the medial knee, it is moderate in intensity. The patient feels unstable with the knee, fears falling, and has significant limitation with activities of daily living, recreation, and walks with no device.     Past Medical History:   Diagnosis Date    Anemia     Arthritis     KNEE    Depression     GERD (gastroesophageal reflux disease)     hiatal hernia / reflux    Heart murmur     Hiatal hernia     High cholesterol     Hypertension     Psychiatric disorder     stress    PUD (peptic ulcer disease)        Past Surgical History:   Procedure Laterality Date    HX  SECTION  2001    HX CHOLECYSTECTOMY      HX DILATION AND CURETTAGE  2009    x 2    HX GI      COLONOSCOPY    HX GI      ENDOSCOPY    HX HYSTERECTOMY  2009    PARTIAL    HX KNEE ARTHROSCOPY Right 06/15/2017    TORN MENISCUS    HX KNEE REPLACEMENT Right 2017    HX MENISCUS REPAIR Right     HX PARTIAL HYSTERECTOMY      NV COLSC FLX W/RMVL OF TUMOR POLYP LESION SNARE TQ  2012         NV EGD TRANSORAL BIOPSY SINGLE/MULTIPLE  2012         UPPER GI ENDOSCOPY,BIOPSY  2015            Current Outpatient Medications on File Prior to Visit   Medication Sig Dispense Refill    aspirin delayed-release 325 mg tablet Take 1 Tablet by mouth every six (6) hours as needed for Pain. 60 Tablet 0    rosuvastatin (CRESTOR) 40 mg tablet Take 40 mg by mouth nightly. amLODIPine (NORVASC) 5 mg tablet       acetaminophen (TYLENOL) 500 mg tablet Take 1 Tab by mouth every four (4) hours (while awake). Indications: Pain 100 Tab 0     No current facility-administered medications on file prior to visit.        Allergies   Allergen Reactions    Bees [Sting, Bee] Anaphylaxis    Clindamycin Anaphylaxis       Family History   Problem Relation Age of Onset    Colon Polyps Father     Cancer Father         bladder    COPD Father     Hypertension Mother     Thyroid Disease Mother     Heart Disease Mother         CABG    OSTEOARTHRITIS Mother     Cancer Mother         STOMACH    Heart Surgery Mother         CABG    Alzheimer's Disease Mother     No Known Problems Sister     No Known Problems Brother     Delayed Awakening Sister     No Known Problems Sister        Social History     Socioeconomic History    Marital status:    Tobacco Use    Smoking status: Former     Packs/day: 0.25     Years: 10.00     Pack years: 2.50     Types: Cigarettes     Quit date: 1993     Years since quittin.7    Smokeless tobacco: Never   Vaping Use    Vaping Use: Never used   Substance and Sexual Activity    Alcohol use: No    Drug use: No         Review of Systems:       General: Denies headache, lethargy, fever, weight loss  Ears/Nose/Throat: Denies ear discharge, drainage, nosebleeds, hoarse voice, dental problems  Cardiovascular: Denies chest pain, shortness of breath  Lungs: Denies chest pain, breathing problems, wheezing, pneumonia  Stomach: Denies stomach pain, heartburn, constipation, irritable bowel  Skin: Denies rash, sores, open wounds  Musculoskeletal: Admits to knee pain  Genitourinary: Denies dysuria, hematuria, polyuria  Gastrointestinal: Denies constipation, obstipation, diarrhea  Neurological: Denies changes in sight, smell, hearing, taste, seizures. Denies loss of consciousness. Psychiatric: Denies depression, sleep pattern changes, anxiety, change in personality  Endocrine: Denies mood swings, heat or cold intolerance  Hematologic/Lymphatic: Denies anemia, purpura, petechia  Allergic/Immunologic: Denies swelling of throat, pain or swelling at lymph nodes      Physical Examination:    Visit Vitals  BP (!) 158/91 (BP 1 Location: Right arm, BP Patient Position: Sitting, BP Cuff Size: Large adult)   Pulse 79   Temp 97 °F (36.1 °C) (Tympanic)   Ht 5' 6\" (1.676 m)   Wt 255 lb (115.7 kg)   SpO2 97%   BMI 41.16 kg/m²        General: AOX3, no apparent distress  Psychiatric: mood and affect appropriate  Lungs: breathing is symmetric and unlabored bilaterally  Heart: regular rate and rhythm  Abdomen: no guarding  Head: normocephalic, atraumatic  Skin: No significant abnormalities, good turgor  Sensation intact to light touch: L1-S1 dermatomes  Muscular exam: 5/5 strength in all major muscle groups unless noted in specialty exam.    Extremities:      Left upper extremity: Full active and passive range of motion without pain, deformity, no open wound, strength 5/5 in all major muscle groups. Right upper extremity: Full active and passive range of motion without pain, deformity, no open wound, strength 5/5 in all major muscle groups. Right lower extremity: Full active and passive range of motion without pain, deformity, no open wound, strength 5/5 in all major muscle groups. Left lower extremity:  No deformity is noted. Range of motion of the knee is 0-110. Ligamentous testing of the knee indicates stability of the the MCL, LCL, PCL, and ACL. Lachman's, anterior and posterior drawer tests are specifically negative. Medial joint line tenderness to palpation is noted. Popliteal area is unremarkable. 1+  for effusion. No patellar crepitus. Patella tracks centrally. Pivot shift is negative. Strength testing is indicative of 5/5 strength at hip flexion, extension, knee flexion and extension, tibialis anterior, EHL, and FHL. Sensation is intact to light touch in the L1-S1 dermatomes. Capillary refill is less than 2 seconds in the toes. Diagnostics:    Pertinent Diagnostics:  Xrays are available of the left knee, they indicate mild osteoarthritis of the knee joint, no significant other findings, no other osseus abnormalities, fractures, or dislocations. Knee arthroscopy images indicate moderate degenerative changes in all three compartments. Ms. Giorgio Carson has a reminder for a \"due or due soon\" health maintenance. I have asked that she contact her primary care provider for follow-up on this health maintenance. Date of Procedure: 9/23/2022  PROCEDURE NOTE: Left knee injection of Celestone    Consent was obtained from the patient. The correct site was identified after confirmation with the patient. Following identification and confirmation of the correct site with the patient, the superolateral left knee was prepped in the normal sterile fashion. A local anesthetic of 1% lidocaine without epinephrine was then administered to the local tissues. Following, an injection of a mixture of  6 mg Celestone and 1% lidocaine without epinephrine was administered to the left knee. The needle was then withdrawn and the injection site dressed with a sterile bandage at the conclusion. The procedure was well tolerated by the patient. No immediate adverse reactions were noted. Post injection instructions were given.

## 2022-09-23 NOTE — LETTER
9/23/2022    Patient: Reza Cruz   YOB: 1960   Date of Visit: 9/23/2022     Rita Marcus MD  [address]  [address] [address]  Via Fax: [phone]    Dear Rita Marcus MD,      Thank you for referring Ms. Corrine Ji to [facility] for evaluation. My notes for this consultation are attached. If you have questions, please do not hesitate to call me. I look forward to following your patient along with you.       Sincerely,    Mio Elizalde, DO

## 2022-10-03 ENCOUNTER — VIRTUAL VISIT (OUTPATIENT)
Dept: ORTHOPEDIC SURGERY | Age: 62
End: 2022-10-03
Payer: COMMERCIAL

## 2022-10-03 DIAGNOSIS — M17.12 UNILATERAL PRIMARY OSTEOARTHRITIS, LEFT KNEE: Primary | ICD-10-CM

## 2022-10-03 PROCEDURE — 99441 PR PHYS/QHP TELEPHONE EVALUATION 5-10 MIN: CPT | Performed by: ORTHOPAEDIC SURGERY

## 2022-10-04 NOTE — PROGRESS NOTES
10/3/2022      CC: left knee pain    HPI:      This is a 63y.o. year old female who presents for follow up of their left knee injection. The patient states that they have had no significant relief of their pain. The time since injection has been approximately a week. PMH:  Past Medical History:   Diagnosis Date    Anemia     Arthritis     KNEE    Depression     GERD (gastroesophageal reflux disease)     hiatal hernia / reflux    Heart murmur     Hiatal hernia     High cholesterol     Hypertension     Psychiatric disorder     stress    PUD (peptic ulcer disease)        PSxHx:  Past Surgical History:   Procedure Laterality Date    HX  SECTION  2001    HX CHOLECYSTECTOMY  1996    HX DILATION AND CURETTAGE  2009    x 2    HX GI      COLONOSCOPY    HX GI      ENDOSCOPY    HX HYSTERECTOMY  2009    PARTIAL    HX KNEE ARTHROSCOPY Right 06/15/2017    TORN MENISCUS    HX KNEE REPLACEMENT Right 2017    HX MENISCUS REPAIR Right     HX PARTIAL HYSTERECTOMY      [place] COLSC FLX W/RMVL OF TUMOR POLYP LESION SNARE TQ  2012         [place] EGD TRANSORAL BIOPSY SINGLE/MULTIPLE  2012         UPPER GI ENDOSCOPY,BIOPSY  2015            Meds:    Current Outpatient Medications:     diclofenac EC (VOLTAREN) 75 mg EC tablet, Take 1 Tablet by mouth two (2) times a day., Disp: 60 Tablet, Rfl: 0    aspirin delayed-release 325 mg tablet, Take 1 Tablet by mouth every six (6) hours as needed for Pain., Disp: 60 Tablet, Rfl: 0    rosuvastatin (CRESTOR) 40 mg tablet, Take 40 mg by mouth nightly., Disp: , Rfl:     amLODIPine (NORVASC) 5 mg tablet, , Disp: , Rfl:     acetaminophen (TYLENOL) 500 mg tablet, Take 1 Tab by mouth every four (4) hours (while awake). Indications: Pain, Disp: 100 Tab, Rfl: 0    All:   Allergies   Allergen Reactions    Bees [Sting, Bee] Anaphylaxis    Clindamycin Anaphylaxis       Social Hx:  Social History     Socioeconomic History    Marital status:    Tobacco Use    Smoking status: Former     Packs/day: 0.25     Years: 10.00     Pack years: 2.50     Types: Cigarettes     Quit date: 1993     Years since quittin.7    Smokeless tobacco: Never   Vaping Use    Vaping Use: Never used   Substance and Sexual Activity    Alcohol use: No    Drug use: No       Family Hx:  Family History   Problem Relation Age of Onset    Colon Polyps Father     Cancer Father         bladder    COPD Father     Hypertension Mother     Thyroid Disease Mother     Heart Disease Mother         CABG    OSTEOARTHRITIS Mother     Cancer Mother         STOMACH    Heart Surgery Mother         CABG    Alzheimer's Disease Mother     No Known Problems Sister     No Known Problems Brother     Delayed Awakening Sister     No Known Problems Sister          Review of Systems:       General: Denies headache, lethargy, fever, weight loss  Ears/Nose/Throat: Denies ear discharge, drainage, nosebleeds, hoarse voice, dental problems  Cardiovascular: Denies chest pain, shortness of breath  Lungs: Denies chest pain, breathing problems, wheezing, pneumonia  Stomach: Denies stomach pain, heartburn, constipation, irritable bowel  Skin: Denies rash, sores, open wounds  Musculoskeletal: left knee pain - unresolved  Genitourinary: Denies dysuria, hematuria, polyuria  Gastrointestinal: Denies constipation, obstipation, diarrhea  Neurological: Denies changes in sight, smell, hearing, taste, seizures. Denies loss of consciousness. Psychiatric: Denies depression, sleep pattern changes, anxiety, change in personality  Endocrine: Denies mood swings, heat or cold intolerance  Hematologic/Lymphatic: Denies anemia, purpura, petechia  Allergic/Immunologic: Denies swelling of throat, pain or swelling at lymph nodes      Physical Examination:    There were no vitals taken for this visit.      General: AOX3, no apparent distress  Psychiatric: mood and affect appropriate        Diagnostics:    Pertinent Diagnostics: none    Assessment: Status post left knee injection  Plan: This patient and I discussed the normal course for injections, we discussed that pain relief will likely be temporary to some degree, but I cannot predict the longevity of relief. We also discussed the limitations of injections, and that I cannot inject the same area any more often than every three months. We will proceed with ibuprofen, she wants to stop diclofenac due to adverse reaction. Patient was in [place] at the time of consultation. I was in the office while conducting this encounter. Consent:  She and/or her healthcare decision maker is aware that this patient-initiated Telehealth encounter is a billable service, with coverage as determined by her insurance carrier. She is aware that she may receive a bill and has provided verbal consent to proceed: Yes    This virtual visit was conducted telephone encounter only. -  I affirm this is a Patient Initiated Episode with an Established Patient who has not had a related appointment within my department in the past 7 days or scheduled within the next 24 hours. Note: this encounter is not billable if this call serves to triage the patient into an appointment for the relevant concern. Total Time: minutes: 5-10 minutes. Ms. Seymour Piña has a reminder for a \"due or due soon\" health maintenance. I have asked that she contact her primary care provider for follow-up on this health maintenance.

## 2022-11-30 ENCOUNTER — TELEPHONE (OUTPATIENT)
Dept: ORTHOPEDIC SURGERY | Age: 62
End: 2022-11-30

## 2022-11-30 NOTE — TELEPHONE ENCOUNTER
Returned patient's phone call and LVM requesting a call back to get a refill request placed for her as stated on the VM she left us.

## 2022-12-01 ENCOUNTER — TELEPHONE (OUTPATIENT)
Dept: ORTHOPEDIC SURGERY | Age: 62
End: 2022-12-01

## 2022-12-01 DIAGNOSIS — Z47.89 ORTHOPEDIC AFTERCARE: ICD-10-CM

## 2022-12-01 DIAGNOSIS — M25.561 RIGHT KNEE PAIN, UNSPECIFIED CHRONICITY: ICD-10-CM

## 2022-12-01 DIAGNOSIS — M89.8X6 PAIN OF RIGHT TIBIA: ICD-10-CM

## 2022-12-01 DIAGNOSIS — M17.12 UNILATERAL PRIMARY OSTEOARTHRITIS, LEFT KNEE: Primary | ICD-10-CM

## 2022-12-01 DIAGNOSIS — M89.8X6 PAIN IN LEFT TIBIA: ICD-10-CM

## 2022-12-01 RX ORDER — DICLOFENAC SODIUM 75 MG/1
75 TABLET, DELAYED RELEASE ORAL 2 TIMES DAILY
Qty: 60 TABLET | Refills: 0 | OUTPATIENT
Start: 2022-12-01

## 2022-12-01 NOTE — TELEPHONE ENCOUNTER
Patient called and expressed that she is in terrible pain in both knees and both Tib/fibs. Patient has been scheduled for an appointment on 12/21/22 per her request at 7:45 am. Patient has asked for her x-ray orders to be faxed over to the [facility] location at [phone] where her insurance will cover the cost of them at no cost to her.

## 2022-12-01 NOTE — TELEPHONE ENCOUNTER
The [facility] from [facility] in [place] called to say they will not be able to accept the faxed over x-ray orders stating they are not able to do outside facilities orders. She states she has called the patient LVM explaining this as well. If the patient would like to be seen for her painful legs she can be and x-rays could than be taken and provided to the patient on CD. Size Of Margin In Cm: 0.3

## 2022-12-01 NOTE — TELEPHONE ENCOUNTER
Patient was left a VM explaining why her prescription was denied. The reason being that on [facility] O apt note that an adverse reaction had been noted.

## 2022-12-05 ENCOUNTER — TELEPHONE (OUTPATIENT)
Dept: ORTHOPEDIC SURGERY | Age: 62
End: 2022-12-05

## 2022-12-05 NOTE — TELEPHONE ENCOUNTER
Returned the patient's phone call about getting her appointment changed to include x-rays. Per her request a detailed message was left on her VM stating that adjustments made to her 12/21/22 appointment to include x-rays.

## 2022-12-21 ENCOUNTER — HOSPITAL ENCOUNTER (OUTPATIENT)
Dept: GENERAL RADIOLOGY | Age: 62
Discharge: HOME | End: 2022-12-21
Payer: COMMERCIAL

## 2022-12-21 ENCOUNTER — TELEPHONE (OUTPATIENT)
Dept: ORTHOPEDIC SURGERY | Age: 62
End: 2022-12-21

## 2022-12-21 DIAGNOSIS — M25.561 RIGHT KNEE PAIN, UNSPECIFIED CHRONICITY: ICD-10-CM

## 2022-12-21 DIAGNOSIS — M89.8X6 PAIN IN LEFT TIBIA: ICD-10-CM

## 2022-12-21 DIAGNOSIS — M89.8X6 PAIN OF RIGHT TIBIA: ICD-10-CM

## 2022-12-21 DIAGNOSIS — Z47.89 ORTHOPEDIC AFTERCARE: ICD-10-CM

## 2022-12-21 PROCEDURE — 73590 X-RAY EXAM OF LOWER LEG: CPT

## 2022-12-21 PROCEDURE — 73560 X-RAY EXAM OF KNEE 1 OR 2: CPT

## 2022-12-21 PROCEDURE — 73564 X-RAY EXAM KNEE 4 OR MORE: CPT

## 2022-12-21 NOTE — TELEPHONE ENCOUNTER
LVM for patient letting her know she would need to arrive no later than 9:15 am and be checked in for her 9:00 am apt on 12/21/22 or she would need to r/s her appointment.

## 2022-12-28 ENCOUNTER — TELEPHONE (OUTPATIENT)
Dept: ORTHOPEDIC SURGERY | Age: 62
End: 2022-12-28

## 2022-12-28 ENCOUNTER — VIRTUAL VISIT (OUTPATIENT)
Dept: ORTHOPEDIC SURGERY | Age: 62
End: 2022-12-28
Payer: COMMERCIAL

## 2022-12-28 DIAGNOSIS — Z47.89 ORTHOPEDIC AFTERCARE: ICD-10-CM

## 2022-12-28 DIAGNOSIS — M17.12 UNILATERAL PRIMARY OSTEOARTHRITIS, LEFT KNEE: Primary | ICD-10-CM

## 2022-12-28 PROCEDURE — 99214 OFFICE O/P EST MOD 30 MIN: CPT | Performed by: ORTHOPAEDIC SURGERY

## 2022-12-28 RX ORDER — CELECOXIB 200 MG/1
200 CAPSULE ORAL 2 TIMES DAILY
Qty: 180 CAPSULE | Refills: 0 | Status: SHIPPED | OUTPATIENT
Start: 2022-12-28

## 2022-12-28 NOTE — PROGRESS NOTES
2022      CC: Bilateral knee pain    HPI:      This is a 63y.o. year old female who has bilateral knee pain, her left knee was scoped almost a year ago, she has had pain and swelling for several months, she did have an injection into that knee 3 months ago. She has not had significant relief, she has had some disruption in her diclofenac usage, she also complains of right knee pain, this was replaced several years ago, she states that she had a manipulation under anesthesia after that that, she states that she has never really done well with her knee, she presents for evaluation. No hip pain, no back pain.       PMH:  Past Medical History:   Diagnosis Date    Anemia     Arthritis     KNEE    Depression     GERD (gastroesophageal reflux disease)     hiatal hernia / reflux    Heart murmur     Hiatal hernia     High cholesterol     Hypertension     Psychiatric disorder     stress    PUD (peptic ulcer disease)        PSxHx:  Past Surgical History:   Procedure Laterality Date    HX  SECTION  2001    HX CHOLECYSTECTOMY      HX DILATION AND CURETTAGE  2009    x 2    HX GI      COLONOSCOPY    HX GI      ENDOSCOPY    HX HYSTERECTOMY  2009    PARTIAL    HX KNEE ARTHROSCOPY Right 06/15/2017    TORN MENISCUS    HX KNEE REPLACEMENT Right 2017    HX MENISCUS REPAIR Right     HX PARTIAL HYSTERECTOMY      [place] COLSC FLX W/RMVL OF TUMOR POLYP LESION SNARE TQ  2012         [place] EGD TRANSORAL BIOPSY SINGLE/MULTIPLE  2012         UPPER GI ENDOSCOPY,BIOPSY  2015            Meds:    Current Outpatient Medications:     celecoxib (CELEBREX) 200 mg capsule, Take 1 Capsule by mouth two (2) times a day., Disp: 180 Capsule, Rfl: 0    aspirin delayed-release 325 mg tablet, Take 1 Tablet by mouth every six (6) hours as needed for Pain., Disp: 60 Tablet, Rfl: 0    rosuvastatin (CRESTOR) 40 mg tablet, Take 40 mg by mouth nightly., Disp: , Rfl:     amLODIPine (NORVASC) 5 mg tablet, , Disp: , Rfl: acetaminophen (TYLENOL) 500 mg tablet, Take 1 Tab by mouth every four (4) hours (while awake). Indications: Pain, Disp: 100 Tab, Rfl: 0    All: Allergies   Allergen Reactions    Bees [Sting, Bee] Anaphylaxis    Clindamycin Anaphylaxis       Social Hx:  Social History     Socioeconomic History    Marital status:    Tobacco Use    Smoking status: Former     Packs/day: 0.25     Years: 10.00     Pack years: 2.50     Types: Cigarettes     Quit date: 1993     Years since quittin.0    Smokeless tobacco: Never   Vaping Use    Vaping Use: Never used   Substance and Sexual Activity    Alcohol use: No    Drug use: No       Family Hx:  Family History   Problem Relation Age of Onset    Colon Polyps Father     Cancer Father         bladder    COPD Father     Hypertension Mother     Thyroid Disease Mother     Heart Disease Mother         CABG    OSTEOARTHRITIS Mother     Cancer Mother         STOMACH    Heart Surgery Mother         CABG    Alzheimer's Disease Mother     No Known Problems Sister     No Known Problems Brother     Delayed Awakening Sister     No Known Problems Sister          Review of Systems:       General: Denies headache, lethargy, fever, weight loss  Ears/Nose/Throat: Denies ear discharge, drainage, nosebleeds, hoarse voice, dental problems  Cardiovascular: Denies chest pain, shortness of breath  Lungs: Denies chest pain, breathing problems, wheezing, pneumonia  Stomach: Denies stomach pain, heartburn, constipation, irritable bowel  Skin: Denies rash, sores, open wounds  Musculoskeletal: Bilateral knee pain  Genitourinary: Denies dysuria, hematuria, polyuria  Gastrointestinal: Denies constipation, obstipation, diarrhea  Neurological: Denies changes in sight, smell, hearing, taste, seizures. Denies loss of consciousness.   Psychiatric: Denies depression, sleep pattern changes, anxiety, change in personality  Endocrine: Denies mood swings, heat or cold intolerance  Hematologic/Lymphatic: Denies anemia, purpura, petechia  Allergic/Immunologic: Denies swelling of throat, pain or swelling at lymph nodes      Physical Examination:    There were no vitals taken for this visit. General: AOX3, no apparent distress  Psychiatric: mood and affect appropriate        Diagnostics:    Pertinent Diagnostics: X-rays ordered and reviewed by myself of the bilateral knees indicate left knee osteoarthritis, this is moderate, worse in the medial compartment as well as patellofemoral.  Right knee indicates a cemented revision knee arthroplasty and good position with no evidence of loosening or failure, there are osteophytes at the proximal and distal poles of the patella, no other abnormalities, no indication of loosening. Assessment: Painful right total knee arthroplasty without evidence of mechanical failure, left knee osteoarthritis  Plan: This patient I had a long and thorough discussion regarding her treatment options, the first treatment for knee osteoarthritis would be anti-inflammatories, I have put in a new prescription for celecoxib, physical therapy, she states that she would like to do the exercises that she knows how to do at home and try to be as active as possible. I have also offered her bracing and injections, she has deferred these at this point. For the right knee, it is very difficult for me to diagnose this as this is a virtual visit, have asked her to come in for a clinical visit at her convenience after she has had a course of celecoxib, she will follow-up in 2 to 3 weeks for repeat clinical check in office. She was in [place] at the time of consultation. I was in the office while conducting this encounter. Consent:  She and/or her healthcare decision maker is aware that this patient-initiated Telehealth encounter is a billable service, with coverage as determined by her insurance carrier.  She is aware that she may receive a bill and has provided verbal consent to proceed: Yes    This virtual visit was conducted via [employer]. Pursuant to the emergency declaration under the [facility], [facility] waiver authority and the [employer] Act, this Virtual  Visit was conducted to reduce the patient's risk of exposure to COVID-19 and provide continuity of care for an established patient. Services were provided through a video synchronous discussion virtually to substitute for in-person clinic visit. Due to this being a TeleHealth evaluation, many elements of the physical examination are unable to be assessed. Ms. Irene Reynolds has a reminder for a \"due or due soon\" health maintenance. I have asked that she contact her primary care provider for follow-up on this health maintenance.

## 2023-01-18 ENCOUNTER — OFFICE VISIT (OUTPATIENT)
Dept: ORTHOPEDIC SURGERY | Age: 63
End: 2023-01-18
Payer: COMMERCIAL

## 2023-01-18 VITALS
HEART RATE: 75 BPM | SYSTOLIC BLOOD PRESSURE: 132 MMHG | OXYGEN SATURATION: 100 % | BODY MASS INDEX: 40.27 KG/M2 | HEIGHT: 66 IN | TEMPERATURE: 97.9 F | WEIGHT: 250.6 LBS | DIASTOLIC BLOOD PRESSURE: 79 MMHG

## 2023-01-18 DIAGNOSIS — M24.661 ARTHROFIBROSIS OF KNEE JOINT, RIGHT: ICD-10-CM

## 2023-01-18 DIAGNOSIS — M17.12 UNILATERAL PRIMARY OSTEOARTHRITIS, LEFT KNEE: Primary | ICD-10-CM

## 2023-01-18 PROCEDURE — 99213 OFFICE O/P EST LOW 20 MIN: CPT | Performed by: ORTHOPAEDIC SURGERY

## 2023-01-18 NOTE — LETTER
1/18/2023    Patient: Kunal Gonsalez   YOB: 1960   Date of Visit: 1/18/2023     Emmie Bess MD  [address]  [address] [address]  Via Fax: [phone]    Dear Emmie Bess MD,      Thank you for referring Ms. Lay Guerrero to [facility] for evaluation. My notes for this consultation are attached. If you have questions, please do not hesitate to call me. I look forward to following your patient along with you.       Sincerely,    Ivis Fuchs, DO

## 2023-01-18 NOTE — PROGRESS NOTES
2023      CC: bilateral knee pain    HPI:      This is a 63y.o. year old female who presents for a follow up visit. The patient was last seen and diagnosed with left knee osteoarthritis, she also complains of stiffness right knee. The patient's treatments since the most recent visit have comprised of celebrex. The patient has had good relief of the chief complaint. PMH:  Past Medical History:   Diagnosis Date    Anemia     Arthritis     KNEE    Depression     GERD (gastroesophageal reflux disease)     hiatal hernia / reflux    Heart murmur     Hiatal hernia     High cholesterol     Hypertension     Psychiatric disorder     stress    PUD (peptic ulcer disease)        PSxHx:  Past Surgical History:   Procedure Laterality Date    HX  SECTION  2001    HX CHOLECYSTECTOMY      HX DILATION AND CURETTAGE  2009    x 2    HX GI      COLONOSCOPY    HX GI      ENDOSCOPY    HX HYSTERECTOMY  2009    PARTIAL    HX KNEE ARTHROSCOPY Right 06/15/2017    TORN MENISCUS    HX KNEE REPLACEMENT Right 2017    HX MENISCUS REPAIR Right     HX PARTIAL HYSTERECTOMY      [place] COLSC FLX W/RMVL OF TUMOR POLYP LESION SNARE TQ  2012         [place] EGD TRANSORAL BIOPSY SINGLE/MULTIPLE  2012         UPPER GI ENDOSCOPY,BIOPSY  2015            Meds:    Current Outpatient Medications:     celecoxib (CELEBREX) 200 mg capsule, Take 1 Capsule by mouth two (2) times a day., Disp: 180 Capsule, Rfl: 0    aspirin delayed-release 325 mg tablet, Take 1 Tablet by mouth every six (6) hours as needed for Pain., Disp: 60 Tablet, Rfl: 0    rosuvastatin (CRESTOR) 40 mg tablet, Take 40 mg by mouth nightly., Disp: , Rfl:     amLODIPine (NORVASC) 5 mg tablet, , Disp: , Rfl:     acetaminophen (TYLENOL) 500 mg tablet, Take 1 Tab by mouth every four (4) hours (while awake). Indications: Pain, Disp: 100 Tab, Rfl: 0    All:   Allergies   Allergen Reactions    Bees [Sting, Bee] Anaphylaxis    Clindamycin Anaphylaxis       Social Hx:  Social History     Socioeconomic History    Marital status:    Tobacco Use    Smoking status: Former     Packs/day: 0.25     Years: 10.00     Pack years: 2.50     Types: Cigarettes     Quit date: 1993     Years since quittin.0    Smokeless tobacco: Never   Vaping Use    Vaping Use: Never used   Substance and Sexual Activity    Alcohol use: No    Drug use: No       Family Hx:  Family History   Problem Relation Age of Onset    Colon Polyps Father     Cancer Father         bladder    COPD Father     Hypertension Mother     Thyroid Disease Mother     Heart Disease Mother         CABG    OSTEOARTHRITIS Mother     Cancer Mother         STOMACH    Heart Surgery Mother         CABG    Alzheimer's Disease Mother     No Known Problems Sister     No Known Problems Brother     Delayed Awakening Sister     No Known Problems Sister          Review of Systems:       General: Denies headache, lethargy, fever, weight loss  Ears/Nose/Throat: Denies ear discharge, drainage, nosebleeds, hoarse voice, dental problems  Cardiovascular: Denies chest pain, shortness of breath  Lungs: Denies chest pain, breathing problems, wheezing, pneumonia  Stomach: Denies stomach pain, heartburn, constipation, irritable bowel  Skin: Denies rash, sores, open wounds  Musculoskeletal: improved knee pain  Genitourinary: Denies dysuria, hematuria, polyuria  Gastrointestinal: Denies constipation, obstipation, diarrhea  Neurological: Denies changes in sight, smell, hearing, taste, seizures. Denies loss of consciousness.   Psychiatric: Denies depression, sleep pattern changes, anxiety, change in personality  Endocrine: Denies mood swings, heat or cold intolerance  Hematologic/Lymphatic: Denies anemia, purpura, petechia  Allergic/Immunologic: Denies swelling of throat, pain or swelling at lymph nodes      Physical Examination:    Visit Vitals  /79 (BP 1 Location: Right upper arm, BP Patient Position: Sitting, BP Cuff Size: Large adult) Pulse 75   Temp 97.9 °F (36.6 °C) (Tympanic)   Ht 5' 6\" (1.676 m)   Wt 250 lb 9.6 oz (113.7 kg)   SpO2 100%   BMI 40.45 kg/m²        General: AOX3, no apparent distress  Psychiatric: mood and affect appropriate  Lungs: breathing is symmetric and unlabored bilaterally  Heart: regular rate and rhythm  Abdomen: no guarding  Head: normocephalic, atraumatic  Skin: No significant abnormalities, good turgor  Sensation intact to light touch: C5-T1 dermatomes  Muscular exam: 5/5 strength in all major muscle groups unless noted in specialty exam.    Extremities        Left lower extremity:  Knee is noted to have a mild effusion. Medial joint line tenderness to palpation with a negative deformity. Patellar crepitus with range of motion is noted. Range of motion is 0-120. Sensation is intact to light touch L1-S1 dermatomes. Knee flexion and extension strength is 5/5 with Tibialis anterior, EHL, FHL being 5/5 as well. No other gross deformity or deficit is noted. Right lower extremity: Knee is noted to have a mild effusion. Medial and lateral joint line tenderness to palpation with a negative deformity. Patellar crepitus with range of motion is noted. Range of motion is 5-90. Sensation is intact to light touch L1-S1 dermatomes. Knee flexion and extension strength is 5/5 with Tibialis anterior, EHL, FHL being 5/5 as well. No other gross deformity or deficit is noted. Diagnostics:    Pertinent Diagnostics: none today    Assessment: arthrofibrosis right knee, oa left knee  Plan: This patient I had a long discussion regarding her treatment options, she does have osteoarthritis of the left knee, with that, it is well managed with Celebrex. She would like to continue to proceed with Celebrex for that. In addition, we had a long discussion regarding right knee. She does have arthrofibrosis, this is been consistent since her previous surgery.   In general, have advised her that surgery for simply stiffness is very difficult to predict in terms of outcome for a knee replacement. We discussed at length the risks and benefits of that particular surgery. After long discussion, she is elected to proceed with continued exercises, weight loss, physical therapy. Follow-up with me will be as needed. Ms. Niki Alvarez has a reminder for a \"due or due soon\" health maintenance. I have asked that she contact her primary care provider for follow-up on this health maintenance.

## 2023-01-18 NOTE — PROGRESS NOTES
[unfilled]  Masha Cristal is a 63 y.o. female  Chief Complaint   Patient presents with    Knee Pain     Julian knee     Visit Vitals  /79 (BP 1 Location: Right upper arm, BP Patient Position: Sitting, BP Cuff Size: Large adult)   Pulse 75   Temp 97.9 °F (36.6 °C) (Tympanic)   Ht 5' 6\" (1.676 m)   Wt 250 lb 9.6 oz (113.7 kg)   SpO2 100%   BMI 40.45 kg/m²     1. Have you been to the ER, urgent care clinic since your last visit? Hospitalized since your last visit? No    2. Have you seen or consulted any other health care providers outside of the [address] [place] [address] since your last visit? Include any pap smears or colon screening.  No

## 2023-02-22 ENCOUNTER — TELEPHONE (OUTPATIENT)
Dept: ORTHOPEDIC SURGERY | Age: 63
End: 2023-02-22

## 2023-02-22 NOTE — TELEPHONE ENCOUNTER
Patient called in requesting a new DM[employer] parking placard form. The form has been provided to Dr. Jagruti Ruiz and a VM was left for the patient informing her we would call when the documents I available for .

## 2023-03-03 ENCOUNTER — TELEPHONE (OUTPATIENT)
Dept: ORTHOPEDIC SURGERY | Age: 63
End: 2023-03-03

## 2023-03-03 NOTE — TELEPHONE ENCOUNTER
Returned patient's phone call and LVM informing her we had received her message regarding her DMV parking placard form and would call when completed and ready to be picked up. See order  Do not use hot water to shower or do dishes  Use lukewarm water to shower, do not dry skin after shower, apply medication then pat dry  Wear gloves when doing dishes

## 2023-03-06 ENCOUNTER — TELEPHONE (OUTPATIENT)
Dept: ORTHOPEDIC SURGERY | Age: 63
End: 2023-03-06

## 2023-06-08 RX ORDER — CELECOXIB 200 MG/1
CAPSULE ORAL
Qty: 180 CAPSULE | Refills: 0 | Status: SHIPPED | OUTPATIENT
Start: 2023-06-08

## 2023-06-29 ENCOUNTER — CLINICAL DOCUMENTATION (OUTPATIENT)
Age: 63
End: 2023-06-29

## 2023-08-28 ENCOUNTER — TELEPHONE (OUTPATIENT)
Age: 63
End: 2023-08-28

## 2023-08-28 NOTE — TELEPHONE ENCOUNTER
Returned a phone call from the patient after she left a  requesting a new DMV parking placard from be mailed to her house.  I LVM asking for a return phone call to get her scheduled for an apt as Dr. Marco A Garcia provided the [facility] parking placard form after Lt Knee scope in 3/22/22

## 2024-01-05 ENCOUNTER — OFFICE VISIT (OUTPATIENT)
Age: 64
End: 2024-01-05
Payer: COMMERCIAL

## 2024-01-05 VITALS
HEART RATE: 98 BPM | HEIGHT: 66 IN | BODY MASS INDEX: 42.59 KG/M2 | WEIGHT: 265 LBS | TEMPERATURE: 98 F | RESPIRATION RATE: 18 BRPM | OXYGEN SATURATION: 98 % | SYSTOLIC BLOOD PRESSURE: 169 MMHG | DIASTOLIC BLOOD PRESSURE: 103 MMHG

## 2024-01-05 DIAGNOSIS — M17.12 UNILATERAL PRIMARY OSTEOARTHRITIS, LEFT KNEE: ICD-10-CM

## 2024-01-05 DIAGNOSIS — M25.562 PAIN IN BOTH KNEES, UNSPECIFIED CHRONICITY: Primary | ICD-10-CM

## 2024-01-05 DIAGNOSIS — M25.561 PAIN IN BOTH KNEES, UNSPECIFIED CHRONICITY: Primary | ICD-10-CM

## 2024-01-05 DIAGNOSIS — M24.661 ANKYLOSIS, RIGHT KNEE: ICD-10-CM

## 2024-01-05 PROCEDURE — 99213 OFFICE O/P EST LOW 20 MIN: CPT | Performed by: ORTHOPAEDIC SURGERY

## 2024-01-05 RX ORDER — CELECOXIB 200 MG/1
200 CAPSULE ORAL 2 TIMES DAILY
Qty: 180 CAPSULE | Refills: 3 | Status: SHIPPED | OUTPATIENT
Start: 2024-01-05

## 2024-01-05 RX ORDER — FEXOFENADINE HCL 180 MG/1
180 TABLET ORAL DAILY
COMMUNITY

## 2024-01-05 NOTE — PROGRESS NOTES
Room: 1A  I have reviewed all needed documentation in preparation for visit. Verified patient by name and date of birth  Chief Complaint   Patient presents with    Knee Pain     bilateral       Vitals:    01/05/24 1342   BP: (!) 169/103   Site: Left Upper Arm   Position: Sitting   Cuff Size: Large Adult   Pulse: 98   Resp: 18   Temp: 98 °F (36.7 °C)   TempSrc: Temporal   SpO2: 98%   Weight: 120.2 kg (265 lb)   Height: 1.676 m (5' 6\")        Pain Score:   4    Health Maintenance Review: Patient reminded of \"due or due soon\" health maintenance. I have asked the patient to contact his/her primary care provider (PCP) for follow-up on his/her health maintenance.    \"Have you been to the ER, urgent care clinic since your last visit?  Hospitalized since your last visit?\"    NO    “Have you seen or consulted any other health care providers outside of [facility] since your last visit?”    NO      
strengthening.  Left knee, she has not had injections recently, she could have surgery should she desire, though she is reasonably trepidations over that.  She has requested a renewal of her Celebrex which I have given her and she will follow-up as needed.      Ms. Grayson has a reminder for a \"due or due soon\" health maintenance. I have asked that she contact her primary care provider for follow-up on this health maintenance.

## 2025-01-16 RX ORDER — CELECOXIB 200 MG/1
200 CAPSULE ORAL 2 TIMES DAILY
Qty: 180 CAPSULE | Refills: 3 | OUTPATIENT
Start: 2025-01-16

## 2025-08-05 PROBLEM — M17.12 DEGENERATIVE ARTHRITIS OF LEFT KNEE: Status: ACTIVE | Noted: 2025-08-05

## 2025-08-07 ENCOUNTER — HOSPITAL ENCOUNTER (OUTPATIENT)
Facility: HOSPITAL | Age: 65
Discharge: HOME | End: 2025-08-10
Payer: COMMERCIAL

## 2025-08-07 VITALS
SYSTOLIC BLOOD PRESSURE: 171 MMHG | HEART RATE: 63 BPM | DIASTOLIC BLOOD PRESSURE: 78 MMHG | BODY MASS INDEX: 41.78 KG/M2 | RESPIRATION RATE: 20 BRPM | TEMPERATURE: 98.8 F | HEIGHT: 66 IN | OXYGEN SATURATION: 99 % | WEIGHT: 260 LBS

## 2025-08-07 LAB
ABO + RH BLD: NORMAL
ANION GAP SERPL CALC-SCNC: 11 MMOL/L (ref 2–14)
APPEARANCE UR: CLEAR
BACTERIA URNS QL MICRO: NEGATIVE /HPF
BILIRUB UR QL: NEGATIVE
BLOOD GROUP ANTIBODIES SERPL: NORMAL
BUN SERPL-MCNC: 11 MG/DL (ref 8–23)
BUN/CREAT SERPL: 18 (ref 12–20)
CALCIUM SERPL-MCNC: 9.7 MG/DL (ref 8.8–10.2)
CHLORIDE SERPL-SCNC: 104 MMOL/L (ref 98–107)
CO2 SERPL-SCNC: 28 MMOL/L (ref 20–29)
COLOR UR: ABNORMAL
CREAT SERPL-MCNC: 0.61 MG/DL (ref 0.6–1)
EPITH CASTS URNS QL MICRO: ABNORMAL /LPF
ERYTHROCYTE [DISTWIDTH] IN BLOOD BY AUTOMATED COUNT: 12.5 % (ref 11.5–14.5)
EST. AVERAGE GLUCOSE BLD GHB EST-MCNC: 108 MG/DL
GLUCOSE SERPL-MCNC: 101 MG/DL (ref 65–100)
GLUCOSE UR STRIP.AUTO-MCNC: NEGATIVE MG/DL
HBA1C MFR BLD: 5.4 % (ref 4–5.6)
HCT VFR BLD AUTO: 40.3 % (ref 35–47)
HGB BLD-MCNC: 12.9 G/DL (ref 11.5–16)
HGB UR QL STRIP: NEGATIVE
INR PPP: 1 (ref 0.9–1.1)
KETONES UR QL STRIP.AUTO: NEGATIVE MG/DL
LEUKOCYTE ESTERASE UR QL STRIP.AUTO: ABNORMAL
MCH RBC QN AUTO: 28.4 PG (ref 26–34)
MCHC RBC AUTO-ENTMCNC: 32 G/DL (ref 30–36.5)
MCV RBC AUTO: 88.6 FL (ref 80–99)
NITRITE UR QL STRIP.AUTO: NEGATIVE
NRBC # BLD: 0 K/UL (ref 0–0.01)
NRBC BLD-RTO: 0 PER 100 WBC
PH UR STRIP: 7.5 (ref 5–8)
PLATELET # BLD AUTO: 142 K/UL (ref 150–400)
PMV BLD AUTO: 10.7 FL (ref 8.9–12.9)
POTASSIUM SERPL-SCNC: 4 MMOL/L (ref 3.5–5.1)
PROT UR STRIP-MCNC: NEGATIVE MG/DL
PROTHROMBIN TIME: 10.6 SEC (ref 9.2–11.2)
RBC # BLD AUTO: 4.55 M/UL (ref 3.8–5.2)
RBC #/AREA URNS HPF: ABNORMAL /HPF (ref 0–5)
SODIUM SERPL-SCNC: 143 MMOL/L (ref 136–145)
SP GR UR REFRACTOMETRY: 1.01 (ref 1–1.03)
SPECIMEN EXP DATE BLD: NORMAL
URINE CULTURE IF INDICATED: ABNORMAL
UROBILINOGEN UR QL STRIP.AUTO: 0.2 EU/DL (ref 0.2–1)
WBC # BLD AUTO: 4.3 K/UL (ref 3.6–11)
WBC URNS QL MICRO: ABNORMAL /HPF (ref 0–4)

## 2025-08-07 PROCEDURE — 86900 BLOOD TYPING SEROLOGIC ABO: CPT

## 2025-08-07 PROCEDURE — 83036 HEMOGLOBIN GLYCOSYLATED A1C: CPT

## 2025-08-07 PROCEDURE — 86901 BLOOD TYPING SEROLOGIC RH(D): CPT

## 2025-08-07 PROCEDURE — 81001 URINALYSIS AUTO W/SCOPE: CPT

## 2025-08-07 PROCEDURE — 85027 COMPLETE CBC AUTOMATED: CPT

## 2025-08-07 PROCEDURE — 86850 RBC ANTIBODY SCREEN: CPT

## 2025-08-07 PROCEDURE — 93005 ELECTROCARDIOGRAM TRACING: CPT | Performed by: ORTHOPAEDIC SURGERY

## 2025-08-07 PROCEDURE — 80048 BASIC METABOLIC PNL TOTAL CA: CPT

## 2025-08-07 PROCEDURE — 85610 PROTHROMBIN TIME: CPT

## 2025-08-07 RX ORDER — SENNOSIDES 8.6 MG
1300 CAPSULE ORAL EVERY 8 HOURS PRN
COMMUNITY

## 2025-08-07 ASSESSMENT — PAIN DESCRIPTION - LOCATION: LOCATION: KNEE

## 2025-08-07 ASSESSMENT — PAIN SCALES - GENERAL: PAINLEVEL_OUTOF10: 5

## 2025-08-07 ASSESSMENT — PAIN DESCRIPTION - ORIENTATION: ORIENTATION: LEFT

## 2025-08-08 LAB
BACTERIA SPEC CULT: NORMAL
BACTERIA SPEC CULT: NORMAL
EKG ATRIAL RATE: 64 BPM
EKG DIAGNOSIS: NORMAL
EKG P AXIS: -13 DEGREES
EKG P-R INTERVAL: 152 MS
EKG Q-T INTERVAL: 388 MS
EKG QRS DURATION: 88 MS
EKG QTC CALCULATION (BAZETT): 442 MS
EKG R AXIS: 7 DEGREES
EKG T AXIS: 27 DEGREES
EKG VENTRICULAR RATE: 78 BPM
SERVICE CMNT-IMP: NORMAL

## 2025-08-08 PROCEDURE — 93010 ELECTROCARDIOGRAM REPORT: CPT | Performed by: SPECIALIST

## 2025-08-10 RX ORDER — MUPIROCIN 2 %
OINTMENT (GRAM) TOPICAL 2 TIMES DAILY
Qty: 1 G | Refills: 0 | Status: SHIPPED | OUTPATIENT
Start: 2025-08-10 | End: 2025-08-15

## 2025-08-18 ENCOUNTER — ANESTHESIA EVENT (OUTPATIENT)
Facility: HOSPITAL | Age: 65
End: 2025-08-18
Payer: COMMERCIAL

## 2025-08-19 ENCOUNTER — ANESTHESIA (OUTPATIENT)
Facility: HOSPITAL | Age: 65
End: 2025-08-19
Payer: COMMERCIAL

## 2025-08-19 ENCOUNTER — HOSPITAL ENCOUNTER (OUTPATIENT)
Facility: HOSPITAL | Age: 65
Setting detail: OBSERVATION
Discharge: HOME | End: 2025-08-20
Attending: ORTHOPAEDIC SURGERY | Admitting: ORTHOPAEDIC SURGERY
Payer: COMMERCIAL

## 2025-08-19 DIAGNOSIS — M17.12 PRIMARY OSTEOARTHRITIS OF LEFT KNEE: Primary | ICD-10-CM

## 2025-08-19 LAB
GLUCOSE BLD STRIP.AUTO-MCNC: 101 MG/DL (ref 65–117)
GLUCOSE BLD STRIP.AUTO-MCNC: 166 MG/DL (ref 65–117)
SERVICE CMNT-IMP: ABNORMAL
SERVICE CMNT-IMP: NORMAL

## 2025-08-19 PROCEDURE — 6360000002 HC RX W HCPCS: Performed by: PHYSICIAN ASSISTANT

## 2025-08-19 PROCEDURE — 2720000010 HC SURG SUPPLY STERILE: Performed by: ORTHOPAEDIC SURGERY

## 2025-08-19 PROCEDURE — C1713 ANCHOR/SCREW BN/BN,TIS/BN: HCPCS | Performed by: ORTHOPAEDIC SURGERY

## 2025-08-19 PROCEDURE — 97530 THERAPEUTIC ACTIVITIES: CPT

## 2025-08-19 PROCEDURE — C1776 JOINT DEVICE (IMPLANTABLE): HCPCS | Performed by: ORTHOPAEDIC SURGERY

## 2025-08-19 PROCEDURE — 6360000002 HC RX W HCPCS: Performed by: ANESTHESIOLOGY

## 2025-08-19 PROCEDURE — 2580000003 HC RX 258: Performed by: PHYSICIAN ASSISTANT

## 2025-08-19 PROCEDURE — 2500000003 HC RX 250 WO HCPCS: Performed by: PHYSICIAN ASSISTANT

## 2025-08-19 PROCEDURE — 6370000000 HC RX 637 (ALT 250 FOR IP): Performed by: ORTHOPAEDIC SURGERY

## 2025-08-19 PROCEDURE — 3700000000 HC ANESTHESIA ATTENDED CARE: Performed by: ORTHOPAEDIC SURGERY

## 2025-08-19 PROCEDURE — G0378 HOSPITAL OBSERVATION PER HR: HCPCS

## 2025-08-19 PROCEDURE — 64447 NJX AA&/STRD FEMORAL NRV IMG: CPT | Performed by: ANESTHESIOLOGY

## 2025-08-19 PROCEDURE — P9045 ALBUMIN (HUMAN), 5%, 250 ML: HCPCS | Performed by: NURSE ANESTHETIST, CERTIFIED REGISTERED

## 2025-08-19 PROCEDURE — 7100000001 HC PACU RECOVERY - ADDTL 15 MIN: Performed by: ORTHOPAEDIC SURGERY

## 2025-08-19 PROCEDURE — 7100000000 HC PACU RECOVERY - FIRST 15 MIN: Performed by: ORTHOPAEDIC SURGERY

## 2025-08-19 PROCEDURE — 3600000014 HC SURGERY LEVEL 4 ADDTL 15MIN: Performed by: ORTHOPAEDIC SURGERY

## 2025-08-19 PROCEDURE — 3700000001 HC ADD 15 MINUTES (ANESTHESIA): Performed by: ORTHOPAEDIC SURGERY

## 2025-08-19 PROCEDURE — 2580000003 HC RX 258: Performed by: NURSE ANESTHETIST, CERTIFIED REGISTERED

## 2025-08-19 PROCEDURE — 6360000002 HC RX W HCPCS: Performed by: NURSE ANESTHETIST, CERTIFIED REGISTERED

## 2025-08-19 PROCEDURE — 2500000003 HC RX 250 WO HCPCS: Performed by: NURSE ANESTHETIST, CERTIFIED REGISTERED

## 2025-08-19 PROCEDURE — 97161 PT EVAL LOW COMPLEX 20 MIN: CPT

## 2025-08-19 PROCEDURE — 6370000000 HC RX 637 (ALT 250 FOR IP): Performed by: PHYSICIAN ASSISTANT

## 2025-08-19 PROCEDURE — 2709999900 HC NON-CHARGEABLE SUPPLY: Performed by: ORTHOPAEDIC SURGERY

## 2025-08-19 PROCEDURE — 3600000004 HC SURGERY LEVEL 4 BASE: Performed by: ORTHOPAEDIC SURGERY

## 2025-08-19 PROCEDURE — 82962 GLUCOSE BLOOD TEST: CPT

## 2025-08-19 PROCEDURE — 2580000003 HC RX 258: Performed by: ANESTHESIOLOGY

## 2025-08-19 DEVICE — IMPLANTABLE DEVICE: Type: IMPLANTABLE DEVICE | Site: KNEE | Status: FUNCTIONAL

## 2025-08-19 DEVICE — IMPL CAPPED KNEE K1 TOTAL/HEMI STD CEMENTED DJO: Type: IMPLANTABLE DEVICE | Site: KNEE | Status: FUNCTIONAL

## 2025-08-19 DEVICE — CEMENT BNE MED VISC 80 GM GENTAMICIN PALACOS MV+G PRO: Type: IMPLANTABLE DEVICE | Site: KNEE | Status: FUNCTIONAL

## 2025-08-19 RX ORDER — DEXAMETHASONE SODIUM PHOSPHATE 4 MG/ML
INJECTION, SOLUTION INTRA-ARTICULAR; INTRALESIONAL; INTRAMUSCULAR; INTRAVENOUS; SOFT TISSUE
Status: DISCONTINUED | OUTPATIENT
Start: 2025-08-19 | End: 2025-08-19 | Stop reason: SDUPTHER

## 2025-08-19 RX ORDER — SODIUM CHLORIDE 9 MG/ML
INJECTION, SOLUTION INTRAVENOUS CONTINUOUS
Status: DISCONTINUED | OUTPATIENT
Start: 2025-08-19 | End: 2025-08-20 | Stop reason: HOSPADM

## 2025-08-19 RX ORDER — SODIUM CHLORIDE 9 MG/ML
INJECTION, SOLUTION INTRAVENOUS PRN
Status: DISCONTINUED | OUTPATIENT
Start: 2025-08-19 | End: 2025-08-19 | Stop reason: HOSPADM

## 2025-08-19 RX ORDER — MIDAZOLAM HYDROCHLORIDE 2 MG/2ML
2 INJECTION, SOLUTION INTRAMUSCULAR; INTRAVENOUS ONCE
Status: COMPLETED | OUTPATIENT
Start: 2025-08-19 | End: 2025-08-19

## 2025-08-19 RX ORDER — HYDRALAZINE HYDROCHLORIDE 20 MG/ML
10 INJECTION INTRAMUSCULAR; INTRAVENOUS
Status: DISCONTINUED | OUTPATIENT
Start: 2025-08-19 | End: 2025-08-19 | Stop reason: HOSPADM

## 2025-08-19 RX ORDER — HYDROXYZINE HYDROCHLORIDE 10 MG/1
10 TABLET, FILM COATED ORAL EVERY 8 HOURS PRN
Status: DISCONTINUED | OUTPATIENT
Start: 2025-08-19 | End: 2025-08-20 | Stop reason: HOSPADM

## 2025-08-19 RX ORDER — ONDANSETRON 2 MG/ML
INJECTION INTRAMUSCULAR; INTRAVENOUS
Status: DISCONTINUED | OUTPATIENT
Start: 2025-08-19 | End: 2025-08-19 | Stop reason: SDUPTHER

## 2025-08-19 RX ORDER — FENTANYL CITRATE 50 UG/ML
25 INJECTION, SOLUTION INTRAMUSCULAR; INTRAVENOUS EVERY 5 MIN PRN
Status: COMPLETED | OUTPATIENT
Start: 2025-08-19 | End: 2025-08-19

## 2025-08-19 RX ORDER — SODIUM CHLORIDE 9 MG/ML
INJECTION, SOLUTION INTRAVENOUS
Status: DISCONTINUED | OUTPATIENT
Start: 2025-08-19 | End: 2025-08-19 | Stop reason: SDUPTHER

## 2025-08-19 RX ORDER — ACETAMINOPHEN 500 MG
500 TABLET ORAL
Status: DISCONTINUED | OUTPATIENT
Start: 2025-08-19 | End: 2025-08-20 | Stop reason: HOSPADM

## 2025-08-19 RX ORDER — SODIUM CHLORIDE 0.9 % (FLUSH) 0.9 %
5-40 SYRINGE (ML) INJECTION EVERY 12 HOURS SCHEDULED
Status: DISCONTINUED | OUTPATIENT
Start: 2025-08-19 | End: 2025-08-19 | Stop reason: HOSPADM

## 2025-08-19 RX ORDER — ALBUMIN HUMAN 50 G/1000ML
SOLUTION INTRAVENOUS
Status: DISCONTINUED | OUTPATIENT
Start: 2025-08-19 | End: 2025-08-19

## 2025-08-19 RX ORDER — ATENOLOL 50 MG/1
50 TABLET ORAL NIGHTLY
Status: DISCONTINUED | OUTPATIENT
Start: 2025-08-19 | End: 2025-08-20 | Stop reason: HOSPADM

## 2025-08-19 RX ORDER — SODIUM CHLORIDE 9 MG/ML
INJECTION, SOLUTION INTRAVENOUS PRN
Status: DISCONTINUED | OUTPATIENT
Start: 2025-08-19 | End: 2025-08-20 | Stop reason: HOSPADM

## 2025-08-19 RX ORDER — OXYCODONE HYDROCHLORIDE 5 MG/1
5 TABLET ORAL
Status: DISCONTINUED | OUTPATIENT
Start: 2025-08-19 | End: 2025-08-20 | Stop reason: HOSPADM

## 2025-08-19 RX ORDER — FENTANYL CITRATE 50 UG/ML
100 INJECTION, SOLUTION INTRAMUSCULAR; INTRAVENOUS ONCE
Refills: 0 | Status: COMPLETED | OUTPATIENT
Start: 2025-08-19 | End: 2025-08-19

## 2025-08-19 RX ORDER — SODIUM CHLORIDE 0.9 % (FLUSH) 0.9 %
5-40 SYRINGE (ML) INJECTION PRN
Status: DISCONTINUED | OUTPATIENT
Start: 2025-08-19 | End: 2025-08-19 | Stop reason: HOSPADM

## 2025-08-19 RX ORDER — ONDANSETRON 4 MG/1
4 TABLET, ORALLY DISINTEGRATING ORAL EVERY 8 HOURS PRN
Status: DISCONTINUED | OUTPATIENT
Start: 2025-08-19 | End: 2025-08-20 | Stop reason: HOSPADM

## 2025-08-19 RX ORDER — ROPIVACAINE HYDROCHLORIDE 5 MG/ML
INJECTION, SOLUTION EPIDURAL; INFILTRATION; PERINEURAL
Status: COMPLETED | OUTPATIENT
Start: 2025-08-19 | End: 2025-08-19

## 2025-08-19 RX ORDER — MIDAZOLAM HYDROCHLORIDE 2 MG/2ML
2 INJECTION, SOLUTION INTRAMUSCULAR; INTRAVENOUS PRN
Status: DISCONTINUED | OUTPATIENT
Start: 2025-08-19 | End: 2025-08-19 | Stop reason: HOSPADM

## 2025-08-19 RX ORDER — BISACODYL 10 MG
10 SUPPOSITORY, RECTAL RECTAL DAILY PRN
Status: DISCONTINUED | OUTPATIENT
Start: 2025-08-19 | End: 2025-08-20 | Stop reason: HOSPADM

## 2025-08-19 RX ORDER — ROPIVACAINE HYDROCHLORIDE 5 MG/ML
30 INJECTION, SOLUTION EPIDURAL; INFILTRATION; PERINEURAL ONCE
Status: DISCONTINUED | OUTPATIENT
Start: 2025-08-19 | End: 2025-08-19 | Stop reason: HOSPADM

## 2025-08-19 RX ORDER — KETOROLAC TROMETHAMINE 30 MG/ML
15 INJECTION, SOLUTION INTRAMUSCULAR; INTRAVENOUS EVERY 6 HOURS
Status: COMPLETED | OUTPATIENT
Start: 2025-08-19 | End: 2025-08-20

## 2025-08-19 RX ORDER — ONDANSETRON 2 MG/ML
4 INJECTION INTRAMUSCULAR; INTRAVENOUS
Status: DISCONTINUED | OUTPATIENT
Start: 2025-08-19 | End: 2025-08-19 | Stop reason: HOSPADM

## 2025-08-19 RX ORDER — SODIUM CHLORIDE 0.9 % (FLUSH) 0.9 %
5-40 SYRINGE (ML) INJECTION PRN
Status: DISCONTINUED | OUTPATIENT
Start: 2025-08-19 | End: 2025-08-20 | Stop reason: HOSPADM

## 2025-08-19 RX ORDER — EPHEDRINE SULFATE 50 MG/ML
INJECTION INTRAVENOUS
Status: DISCONTINUED | OUTPATIENT
Start: 2025-08-19 | End: 2025-08-19 | Stop reason: SDUPTHER

## 2025-08-19 RX ORDER — OXYCODONE HYDROCHLORIDE 5 MG/1
2.5 TABLET ORAL
Status: DISCONTINUED | OUTPATIENT
Start: 2025-08-19 | End: 2025-08-20 | Stop reason: HOSPADM

## 2025-08-19 RX ORDER — PROCHLORPERAZINE EDISYLATE 5 MG/ML
5 INJECTION INTRAMUSCULAR; INTRAVENOUS
Status: DISCONTINUED | OUTPATIENT
Start: 2025-08-19 | End: 2025-08-19 | Stop reason: HOSPADM

## 2025-08-19 RX ORDER — PROPOFOL 10 MG/ML
INJECTION, EMULSION INTRAVENOUS
Status: DISCONTINUED | OUTPATIENT
Start: 2025-08-19 | End: 2025-08-19 | Stop reason: SDUPTHER

## 2025-08-19 RX ORDER — SODIUM CHLORIDE, SODIUM LACTATE, POTASSIUM CHLORIDE, CALCIUM CHLORIDE 600; 310; 30; 20 MG/100ML; MG/100ML; MG/100ML; MG/100ML
INJECTION, SOLUTION INTRAVENOUS CONTINUOUS
Status: DISCONTINUED | OUTPATIENT
Start: 2025-08-19 | End: 2025-08-19 | Stop reason: HOSPADM

## 2025-08-19 RX ORDER — SODIUM CHLORIDE 0.9 % (FLUSH) 0.9 %
5-40 SYRINGE (ML) INJECTION EVERY 12 HOURS SCHEDULED
Status: DISCONTINUED | OUTPATIENT
Start: 2025-08-19 | End: 2025-08-20 | Stop reason: HOSPADM

## 2025-08-19 RX ORDER — ASPIRIN 81 MG/1
81 TABLET ORAL 2 TIMES DAILY
Status: DISCONTINUED | OUTPATIENT
Start: 2025-08-19 | End: 2025-08-20 | Stop reason: HOSPADM

## 2025-08-19 RX ORDER — ROSUVASTATIN CALCIUM 10 MG/1
40 TABLET, COATED ORAL EVERY EVENING
Status: DISCONTINUED | OUTPATIENT
Start: 2025-08-19 | End: 2025-08-20 | Stop reason: HOSPADM

## 2025-08-19 RX ORDER — OXYCODONE HYDROCHLORIDE 5 MG/1
5 TABLET ORAL
Status: DISCONTINUED | OUTPATIENT
Start: 2025-08-19 | End: 2025-08-19 | Stop reason: HOSPADM

## 2025-08-19 RX ORDER — ONDANSETRON 2 MG/ML
4 INJECTION INTRAMUSCULAR; INTRAVENOUS EVERY 6 HOURS PRN
Status: DISCONTINUED | OUTPATIENT
Start: 2025-08-19 | End: 2025-08-20 | Stop reason: HOSPADM

## 2025-08-19 RX ORDER — ALBUMIN HUMAN 50 G/1000ML
SOLUTION INTRAVENOUS
Status: DISCONTINUED | OUTPATIENT
Start: 2025-08-19 | End: 2025-08-19 | Stop reason: SDUPTHER

## 2025-08-19 RX ORDER — FENTANYL CITRATE 50 UG/ML
100 INJECTION, SOLUTION INTRAMUSCULAR; INTRAVENOUS
Status: DISCONTINUED | OUTPATIENT
Start: 2025-08-19 | End: 2025-08-19 | Stop reason: HOSPADM

## 2025-08-19 RX ORDER — MIDAZOLAM HYDROCHLORIDE 1 MG/ML
INJECTION, SOLUTION INTRAMUSCULAR; INTRAVENOUS
Status: DISCONTINUED | OUTPATIENT
Start: 2025-08-19 | End: 2025-08-19 | Stop reason: SDUPTHER

## 2025-08-19 RX ORDER — ACETAMINOPHEN 500 MG
1000 TABLET ORAL ONCE
Status: COMPLETED | OUTPATIENT
Start: 2025-08-19 | End: 2025-08-19

## 2025-08-19 RX ORDER — LIDOCAINE HYDROCHLORIDE 10 MG/ML
1 INJECTION, SOLUTION EPIDURAL; INFILTRATION; INTRACAUDAL; PERINEURAL
Status: DISCONTINUED | OUTPATIENT
Start: 2025-08-19 | End: 2025-08-19 | Stop reason: HOSPADM

## 2025-08-19 RX ORDER — SENNA AND DOCUSATE SODIUM 50; 8.6 MG/1; MG/1
1 TABLET, FILM COATED ORAL 2 TIMES DAILY
Status: DISCONTINUED | OUTPATIENT
Start: 2025-08-19 | End: 2025-08-20 | Stop reason: HOSPADM

## 2025-08-19 RX ORDER — FENTANYL CITRATE 50 UG/ML
INJECTION, SOLUTION INTRAMUSCULAR; INTRAVENOUS
Status: DISCONTINUED | OUTPATIENT
Start: 2025-08-19 | End: 2025-08-19 | Stop reason: SDUPTHER

## 2025-08-19 RX ORDER — 0.9 % SODIUM CHLORIDE 0.9 %
500 INTRAVENOUS SOLUTION INTRAVENOUS PRN
Status: DISCONTINUED | OUTPATIENT
Start: 2025-08-19 | End: 2025-08-20 | Stop reason: HOSPADM

## 2025-08-19 RX ORDER — HYDROMORPHONE HYDROCHLORIDE 1 MG/ML
0.5 INJECTION, SOLUTION INTRAMUSCULAR; INTRAVENOUS; SUBCUTANEOUS EVERY 5 MIN PRN
Status: COMPLETED | OUTPATIENT
Start: 2025-08-19 | End: 2025-08-19

## 2025-08-19 RX ORDER — CELECOXIB 200 MG/1
200 CAPSULE ORAL ONCE
Status: COMPLETED | OUTPATIENT
Start: 2025-08-19 | End: 2025-08-19

## 2025-08-19 RX ADMIN — ASPIRIN 81 MG: 81 TABLET, COATED ORAL at 15:03

## 2025-08-19 RX ADMIN — WATER 2000 MG: 1 INJECTION INTRAMUSCULAR; INTRAVENOUS; SUBCUTANEOUS at 19:17

## 2025-08-19 RX ADMIN — FENTANYL CITRATE 25 MCG: 50 INJECTION INTRAMUSCULAR; INTRAVENOUS at 12:50

## 2025-08-19 RX ADMIN — FENTANYL CITRATE 25 MCG: 50 INJECTION INTRAMUSCULAR; INTRAVENOUS at 12:30

## 2025-08-19 RX ADMIN — SODIUM CHLORIDE, PRESERVATIVE FREE 10 ML: 5 INJECTION INTRAVENOUS at 21:43

## 2025-08-19 RX ADMIN — Medication 10 MG: at 11:35

## 2025-08-19 RX ADMIN — FENTANYL CITRATE 25 MCG: 50 INJECTION INTRAMUSCULAR; INTRAVENOUS at 12:20

## 2025-08-19 RX ADMIN — DEXAMETHASONE SODIUM PHOSPHATE 4 MG: 4 INJECTION, SOLUTION INTRAMUSCULAR; INTRAVENOUS at 09:57

## 2025-08-19 RX ADMIN — ASPIRIN 81 MG: 81 TABLET, COATED ORAL at 21:19

## 2025-08-19 RX ADMIN — ROSUVASTATIN 40 MG: 10 TABLET, FILM COATED ORAL at 19:24

## 2025-08-19 RX ADMIN — KETOROLAC TROMETHAMINE 15 MG: 30 INJECTION, SOLUTION INTRAMUSCULAR; INTRAVENOUS at 19:18

## 2025-08-19 RX ADMIN — SODIUM CHLORIDE: 9 INJECTION, SOLUTION INTRAVENOUS at 11:28

## 2025-08-19 RX ADMIN — FENTANYL CITRATE 10 MCG: 50 INJECTION INTRAMUSCULAR; INTRAVENOUS at 11:21

## 2025-08-19 RX ADMIN — Medication 20 MG: at 09:45

## 2025-08-19 RX ADMIN — Medication 5 MG: at 10:52

## 2025-08-19 RX ADMIN — ATENOLOL 50 MG: 50 TABLET ORAL at 21:19

## 2025-08-19 RX ADMIN — MEPIVACAINE HYDROCHLORIDE 52 MG: 20 INJECTION, SOLUTION EPIDURAL; INFILTRATION at 09:45

## 2025-08-19 RX ADMIN — HYDROMORPHONE HYDROCHLORIDE 0.5 MG: 1 INJECTION, SOLUTION INTRAMUSCULAR; INTRAVENOUS; SUBCUTANEOUS at 12:17

## 2025-08-19 RX ADMIN — WATER 2000 MG: 1 INJECTION INTRAMUSCULAR; INTRAVENOUS; SUBCUTANEOUS at 09:38

## 2025-08-19 RX ADMIN — ACETAMINOPHEN 500 MG: 500 TABLET ORAL at 19:18

## 2025-08-19 RX ADMIN — ALBUMIN (HUMAN) 150 ML: 12.5 INJECTION, SOLUTION INTRAVENOUS at 11:28

## 2025-08-19 RX ADMIN — Medication 5 MG: at 10:07

## 2025-08-19 RX ADMIN — SENNOSIDES, DOCUSATE SODIUM 1 TABLET: 50; 8.6 TABLET, FILM COATED ORAL at 15:02

## 2025-08-19 RX ADMIN — FENTANYL CITRATE 10 MCG: 50 INJECTION INTRAMUSCULAR; INTRAVENOUS at 10:27

## 2025-08-19 RX ADMIN — ROPIVACAINE HYDROCHLORIDE 30 ML: 5 INJECTION EPIDURAL; INFILTRATION; PERINEURAL at 09:14

## 2025-08-19 RX ADMIN — ACETAMINOPHEN 1000 MG: 500 TABLET ORAL at 08:33

## 2025-08-19 RX ADMIN — PHENYLEPHRINE HYDROCHLORIDE 15 MCG/MIN: 10 INJECTION INTRAVENOUS at 10:28

## 2025-08-19 RX ADMIN — FENTANYL CITRATE 25 MCG: 50 INJECTION INTRAMUSCULAR; INTRAVENOUS at 12:40

## 2025-08-19 RX ADMIN — KETOROLAC TROMETHAMINE 15 MG: 30 INJECTION, SOLUTION INTRAMUSCULAR; INTRAVENOUS at 15:02

## 2025-08-19 RX ADMIN — PROPOFOL 50 MCG/KG/MIN: 10 INJECTION, EMULSION INTRAVENOUS at 09:47

## 2025-08-19 RX ADMIN — ONDANSETRON 4 MG: 2 INJECTION INTRAMUSCULAR; INTRAVENOUS at 11:51

## 2025-08-19 RX ADMIN — MIDAZOLAM 2 MG: 1 INJECTION INTRAMUSCULAR; INTRAVENOUS at 09:30

## 2025-08-19 RX ADMIN — SODIUM CHLORIDE: 0.9 INJECTION, SOLUTION INTRAVENOUS at 12:09

## 2025-08-19 RX ADMIN — ALBUMIN (HUMAN) 75 ML: 12.5 INJECTION, SOLUTION INTRAVENOUS at 11:09

## 2025-08-19 RX ADMIN — ALBUMIN (HUMAN) 25 ML: 12.5 INJECTION, SOLUTION INTRAVENOUS at 10:51

## 2025-08-19 RX ADMIN — FENTANYL CITRATE 10 MCG: 50 INJECTION INTRAMUSCULAR; INTRAVENOUS at 10:00

## 2025-08-19 RX ADMIN — FENTANYL CITRATE 20 MCG: 50 INJECTION INTRAMUSCULAR; INTRAVENOUS at 11:51

## 2025-08-19 RX ADMIN — SENNOSIDES, DOCUSATE SODIUM 1 TABLET: 50; 8.6 TABLET, FILM COATED ORAL at 21:19

## 2025-08-19 RX ADMIN — SODIUM CHLORIDE, POTASSIUM CHLORIDE, SODIUM LACTATE AND CALCIUM CHLORIDE: 600; 310; 30; 20 INJECTION, SOLUTION INTRAVENOUS at 08:30

## 2025-08-19 RX ADMIN — ACETAMINOPHEN 500 MG: 500 TABLET ORAL at 15:02

## 2025-08-19 RX ADMIN — FENTANYL CITRATE 100 MCG: 50 INJECTION INTRAMUSCULAR; INTRAVENOUS at 09:09

## 2025-08-19 RX ADMIN — EPHEDRINE SULFATE 10 MG: 50 INJECTION INTRAVENOUS at 11:14

## 2025-08-19 RX ADMIN — HYDROMORPHONE HYDROCHLORIDE 0.5 MG: 1 INJECTION, SOLUTION INTRAMUSCULAR; INTRAVENOUS; SUBCUTANEOUS at 12:22

## 2025-08-19 RX ADMIN — Medication 5 MG: at 10:27

## 2025-08-19 RX ADMIN — OXYCODONE HYDROCHLORIDE 5 MG: 5 TABLET ORAL at 17:13

## 2025-08-19 RX ADMIN — CELECOXIB 200 MG: 200 CAPSULE ORAL at 08:34

## 2025-08-19 RX ADMIN — FENTANYL CITRATE 10 MCG: 50 INJECTION INTRAMUSCULAR; INTRAVENOUS at 10:16

## 2025-08-19 RX ADMIN — FENTANYL CITRATE 10 MCG: 50 INJECTION INTRAMUSCULAR; INTRAVENOUS at 11:01

## 2025-08-19 RX ADMIN — Medication 5 MG: at 11:21

## 2025-08-19 RX ADMIN — MIDAZOLAM HYDROCHLORIDE 2 MG: 1 INJECTION, SOLUTION INTRAMUSCULAR; INTRAVENOUS at 09:12

## 2025-08-19 RX ADMIN — EPHEDRINE SULFATE 10 MG: 50 INJECTION INTRAVENOUS at 10:29

## 2025-08-19 RX ADMIN — TRANEXAMIC ACID 1000 MG: 100 INJECTION, SOLUTION INTRAVENOUS at 10:04

## 2025-08-19 RX ADMIN — ACETAMINOPHEN 500 MG: 500 TABLET ORAL at 21:19

## 2025-08-19 RX ADMIN — FENTANYL CITRATE 10 MCG: 50 INJECTION INTRAMUSCULAR; INTRAVENOUS at 10:51

## 2025-08-19 RX ADMIN — FENTANYL CITRATE 20 MCG: 50 INJECTION INTRAMUSCULAR; INTRAVENOUS at 11:57

## 2025-08-19 ASSESSMENT — PAIN - FUNCTIONAL ASSESSMENT
PAIN_FUNCTIONAL_ASSESSMENT: 0-10

## 2025-08-19 ASSESSMENT — PAIN DESCRIPTION - ORIENTATION: ORIENTATION: LEFT

## 2025-08-19 ASSESSMENT — PAIN SCALES - GENERAL
PAINLEVEL_OUTOF10: 5
PAINLEVEL_OUTOF10: 8
PAINLEVEL_OUTOF10: 2

## 2025-08-19 ASSESSMENT — PAIN DESCRIPTION - LOCATION: LOCATION: LEG

## 2025-08-20 VITALS
TEMPERATURE: 97.9 F | RESPIRATION RATE: 16 BRPM | HEIGHT: 66 IN | WEIGHT: 260 LBS | OXYGEN SATURATION: 99 % | HEART RATE: 69 BPM | BODY MASS INDEX: 41.78 KG/M2 | SYSTOLIC BLOOD PRESSURE: 130 MMHG | DIASTOLIC BLOOD PRESSURE: 66 MMHG

## 2025-08-20 LAB
ANION GAP SERPL CALC-SCNC: 10 MMOL/L (ref 2–14)
BUN SERPL-MCNC: 10 MG/DL (ref 8–23)
BUN/CREAT SERPL: 23 (ref 12–20)
CALCIUM SERPL-MCNC: 8.6 MG/DL (ref 8.8–10.2)
CHLORIDE SERPL-SCNC: 104 MMOL/L (ref 98–107)
CO2 SERPL-SCNC: 25 MMOL/L (ref 20–29)
CREAT SERPL-MCNC: 0.45 MG/DL (ref 0.6–1)
GLUCOSE SERPL-MCNC: 95 MG/DL (ref 65–100)
HCT VFR BLD AUTO: 26.4 % (ref 35–47)
HGB BLD-MCNC: 7.8 G/DL (ref 11.5–16)
POTASSIUM SERPL-SCNC: 4.7 MMOL/L (ref 3.5–5.1)
SODIUM SERPL-SCNC: 138 MMOL/L (ref 136–145)

## 2025-08-20 PROCEDURE — 85018 HEMOGLOBIN: CPT

## 2025-08-20 PROCEDURE — 80048 BASIC METABOLIC PNL TOTAL CA: CPT

## 2025-08-20 PROCEDURE — 6370000000 HC RX 637 (ALT 250 FOR IP): Performed by: PHYSICIAN ASSISTANT

## 2025-08-20 PROCEDURE — 2500000003 HC RX 250 WO HCPCS: Performed by: PHYSICIAN ASSISTANT

## 2025-08-20 PROCEDURE — G0378 HOSPITAL OBSERVATION PER HR: HCPCS

## 2025-08-20 PROCEDURE — 85014 HEMATOCRIT: CPT

## 2025-08-20 PROCEDURE — 97530 THERAPEUTIC ACTIVITIES: CPT

## 2025-08-20 PROCEDURE — 97116 GAIT TRAINING THERAPY: CPT

## 2025-08-20 PROCEDURE — 6360000002 HC RX W HCPCS: Performed by: PHYSICIAN ASSISTANT

## 2025-08-20 PROCEDURE — 96374 THER/PROPH/DIAG INJ IV PUSH: CPT

## 2025-08-20 PROCEDURE — 96376 TX/PRO/DX INJ SAME DRUG ADON: CPT

## 2025-08-20 RX ORDER — ACETAMINOPHEN 500 MG
500 TABLET ORAL EVERY 4 HOURS
Qty: 100 TABLET | Refills: 0 | Status: SHIPPED | OUTPATIENT
Start: 2025-08-20

## 2025-08-20 RX ORDER — OXYCODONE HYDROCHLORIDE 5 MG/1
2.5-5 TABLET ORAL EVERY 4 HOURS PRN
Qty: 42 TABLET | Refills: 0 | Status: SHIPPED | OUTPATIENT
Start: 2025-08-20 | End: 2025-08-27

## 2025-08-20 RX ORDER — ASPIRIN 81 MG/1
81 TABLET ORAL 2 TIMES DAILY
Qty: 60 TABLET | Refills: 0 | Status: SHIPPED | OUTPATIENT
Start: 2025-08-20

## 2025-08-20 RX ORDER — AMOXICILLIN 250 MG
1 CAPSULE ORAL 2 TIMES DAILY PRN
Qty: 60 TABLET | Refills: 0 | Status: SHIPPED | OUTPATIENT
Start: 2025-08-20

## 2025-08-20 RX ADMIN — SENNOSIDES, DOCUSATE SODIUM 1 TABLET: 50; 8.6 TABLET, FILM COATED ORAL at 08:07

## 2025-08-20 RX ADMIN — ACETAMINOPHEN 500 MG: 500 TABLET ORAL at 08:07

## 2025-08-20 RX ADMIN — WATER 2000 MG: 1 INJECTION INTRAMUSCULAR; INTRAVENOUS; SUBCUTANEOUS at 01:47

## 2025-08-20 RX ADMIN — ACETAMINOPHEN 500 MG: 500 TABLET ORAL at 05:52

## 2025-08-20 RX ADMIN — KETOROLAC TROMETHAMINE 15 MG: 30 INJECTION, SOLUTION INTRAMUSCULAR; INTRAVENOUS at 01:47

## 2025-08-20 RX ADMIN — ASPIRIN 81 MG: 81 TABLET, COATED ORAL at 08:07

## 2025-08-20 RX ADMIN — OXYCODONE HYDROCHLORIDE 5 MG: 5 TABLET ORAL at 08:12

## 2025-08-20 RX ADMIN — KETOROLAC TROMETHAMINE 15 MG: 30 INJECTION, SOLUTION INTRAMUSCULAR; INTRAVENOUS at 05:52

## 2025-08-20 ASSESSMENT — PAIN SCALES - GENERAL
PAINLEVEL_OUTOF10: 3
PAINLEVEL_OUTOF10: 2

## 2025-08-20 ASSESSMENT — PAIN - FUNCTIONAL ASSESSMENT
PAIN_FUNCTIONAL_ASSESSMENT: 0-10

## (undated) DEVICE — SYRINGE MED 10ML LUERLOCK TIP W/O SFTY DISP

## (undated) DEVICE — STERILE POLYISOPRENE POWDER-FREE SURGICAL GLOVES WITH EMOLLIENT COATING: Brand: PROTEXIS

## (undated) DEVICE — HANDPIECE SET WITH BONE CLEANING TIP AND SUCTION TUBE: Brand: INTERPULSE

## (undated) DEVICE — STERILE POLYISOPRENE POWDER-FREE SURGICAL GLOVES: Brand: PROTEXIS

## (undated) DEVICE — DRAPE SURG ST 3/4 SHEET W53XL77IN STD POLYPR 3 QTR DISP

## (undated) DEVICE — Device

## (undated) DEVICE — SUTURE VICRYL SZ 0 L27IN ABSRB UD L36MM CT-1 1/2 CIR J260H

## (undated) DEVICE — ZIMMER® STERILE DISPOSABLE TOURNIQUET CUFF WITH PROTECTIVE SLEEVE AND PLC, DUAL PORT, SINGLE BLADDER, 34 IN. (86 CM)

## (undated) DEVICE — SOLUTION IRRIG 1000ML H2O STRL BLT

## (undated) DEVICE — (D)PREP SKN CHLRAPRP APPL 26ML -- CONVERT TO ITEM 371833

## (undated) DEVICE — SOLUTION IRRIG 3000ML LAC RINGERS ARTHROMTC PLAS CONT

## (undated) DEVICE — SOLUTION SURG PREP 26 CC PURPREP

## (undated) DEVICE — DRESSING,GAUZE,XEROFORM,CURAD,1"X8",ST: Brand: CURAD

## (undated) DEVICE — BASIC SINGLE BASIN BTC-LF: Brand: MEDLINE INDUSTRIES, INC.

## (undated) DEVICE — GARMENT,MEDLINE,DVT,INT,CALF,LG, GEN2: Brand: MEDLINE

## (undated) DEVICE — GLOVE SURG SZ 65 L12IN FNGR THK79MIL GRN LTX FREE

## (undated) DEVICE — CUSTOM CAST PD STR

## (undated) DEVICE — SUT ETHLN 3-0 18IN PS2 BLK --

## (undated) DEVICE — STRYKER PERFORMANCE SERIES SAGITTAL BLADE: Brand: STRYKER PERFORMANCE SERIES

## (undated) DEVICE — LIGHT HANDLE: Brand: DEVON

## (undated) DEVICE — GLOVE SURG SZ 85 L12IN FNGR ORTHO 126MIL CRM LTX FREE

## (undated) DEVICE — DRAPE,EXTREMITY,89X128,STERILE: Brand: MEDLINE

## (undated) DEVICE — GLOVE SURG SZ 85 L12IN FNGR THK79MIL GRN LTX FREE

## (undated) DEVICE — HANDPIECE IRRIG FOR INTERPULSE BTTRY PWR SUCT BNE CLN TIP

## (undated) DEVICE — BANDAGE COMPR M W6INXL10YD WHT BGE VELC E MTRX HK AND LOOP

## (undated) DEVICE — SYRINGE 20ML LL S/C 50

## (undated) DEVICE — CONTAINER,SPEC,PNEUM TUBE,3OZ,STRL PATH: Brand: MEDLINE

## (undated) DEVICE — DYONICS 25 INFLOW TUBE SET, 3 PER BOX

## (undated) DEVICE — SUTURE STRATAFIX SPRL SZ 1 L14IN ABSRB VLT L48CM CTX 1/2 SXPD2B405

## (undated) DEVICE — (D)SYR 10ML 1/5ML GRAD NSAF -- PKGING CHANGE USE ITEM 338027

## (undated) DEVICE — X-RAY SPONGES,16 PLY: Brand: DERMACEA

## (undated) DEVICE — TOGA 2XL ZIP SURGICOOL

## (undated) DEVICE — SYRINGE MED 20ML STD CLR PLAS LUERLOCK TIP N CTRL DISP

## (undated) DEVICE — PREP SKN PREVAIL 40ML APPL --

## (undated) DEVICE — SCRUB DRY SURG EZ SCRUB BRUSH PREOPERATIVE GRN

## (undated) DEVICE — Z DISCONTINUED USE 2744636  DRESSING AQUACEL 14 IN ALG W3.5XL14IN POLYUR FLM CVR W/ HYDRCOLL

## (undated) DEVICE — GOWN,PREVENTION PLUS,XLN/2XL,ST,22/CS: Brand: MEDLINE

## (undated) DEVICE — INFECTION CONTROL KIT SYS

## (undated) DEVICE — DERMABOND SKIN ADH 0.7ML -- DERMABOND ADVANCED 12/BX

## (undated) DEVICE — CONTAINER SPEC 4OZ PNEUMAT TB OR THRD LID PT ID LBL

## (undated) DEVICE — CUFF TRNQT W4XL34IN 2 PRT SGL BLDR CYL DISP

## (undated) DEVICE — BRUSH SCRB 4% CHG RED DISP --

## (undated) DEVICE — MARKER SURG SKIN GENTIAN VLT REG TIP W/ 6IN RUL AND BLNK DYNJSM02

## (undated) DEVICE — GLOVE SURG SZ 8.5 L11.8IN FNGR THK7.9MIL CUF THK5.5MIL BLU

## (undated) DEVICE — 3M™ IOBAN™ 2 ANTIMICROBIAL INCISE DRAPE 6651EZ: Brand: IOBAN™ 2

## (undated) DEVICE — PADDING CAST W6INXL4YD NONSTERILE COT RAYON MICROPLEATED

## (undated) DEVICE — REM POLYHESIVE ADULT PATIENT RETURN ELECTRODE: Brand: VALLEYLAB

## (undated) DEVICE — SUTURE VICRYL ABSORBABLE BRAIDED 2-0 CT 36 IN DA UD  VCP957H

## (undated) DEVICE — DRAPE,REIN 53X77,STERILE: Brand: MEDLINE

## (undated) DEVICE — EVACUATOR SMOKE L 10 FT SMOKE MANAGEMENT EXTENDED EDGE ELECTRODE STERILE DISP

## (undated) DEVICE — TRANSFER SET 3": Brand: MEDLINE INDUSTRIES, INC.

## (undated) DEVICE — BRUSH SURG SCRB DRY FLX ANTISEP NAIL CLN STRL LF

## (undated) DEVICE — GLOVE SURG SZ 65 L12IN FNGR THK94MIL STD WHT LTX FREE

## (undated) DEVICE — SUTURE ABSORBABLE MONOFILAMENT 1 CTX 36 CM 48 MM VIO PDS +

## (undated) DEVICE — SUTURE VCRL 1 L27IN ABSRB CT BRAID COAT UD J281H

## (undated) DEVICE — NEEDLE HYPO 21GA L1IN GRN S STL HUB POLYPR SHLD REG BVL

## (undated) DEVICE — GOWN,SIRUS,NONRNF,XLN/2XL,18/CS: Brand: MEDLINE

## (undated) DEVICE — SUTURE VCRL SZ 2-0 L36IN ABSRB UD L40MM CT 1/2 CIR J957H

## (undated) DEVICE — HANDLE LT SNAP ON ULT DURABLE LENS FOR TRUMPF ALC DISPOSABLE

## (undated) DEVICE — T5 HOOD WITH PEEL AWAY FACE SHIELD

## (undated) DEVICE — CARTRIDGE BNE CEM MIX UNIV TWR VAC ROTOR BRK OFF NOZ W/O

## (undated) DEVICE — TIP SUCT CRV REG REDI

## (undated) DEVICE — SYR LR LCK 1ML GRAD NSAF 30ML --

## (undated) DEVICE — ZIMMER® STERILE DISPOSABLE TOURNIQUET CUFF WITH PROTECTIVE SLEEVE AND PLC, DUAL PORT, SINGLE BLADDER, 42 IN. (107 CM)

## (undated) DEVICE — SYR 3ML LL TIP 1/10ML GRAD --

## (undated) DEVICE — SUTURE VICRYL 1 L27IN ABSRB CT BRAID COAT UD J281H

## (undated) DEVICE — SOLUTION IRRIG 3000ML 0.9% SOD CHL FLX CONT 0797208] ICU MEDICAL INC]

## (undated) DEVICE — ELECTRODE PT RET AD L9FT HI MOIST COND ADH HYDRGEL CORDED

## (undated) DEVICE — 4-PORT MANIFOLD: Brand: NEPTUNE 2

## (undated) DEVICE — ZIMMER® STERILE DISPOSABLE TOURNIQUET CUFF WITH PLC, DUAL PORT, SINGLE BLADDER, 34 IN. (86 CM)

## (undated) DEVICE — KNEE ARTHROSCOPY-MRMCASU: Brand: MEDLINE INDUSTRIES, INC.

## (undated) DEVICE — GAUZE SPONGES,12 PLY: Brand: CURITY

## (undated) DEVICE — DEVON™ KNEE AND BODY STRAP 60" X 3" (1.5 M X 7.6 CM): Brand: DEVON

## (undated) DEVICE — CONTAINER,SPECIMEN,STRL PATH,4OZ: Brand: MEDLINE

## (undated) DEVICE — HOOK LOCK LATEX FREE ELASTIC BANDAGE D/L 6INX10YD

## (undated) DEVICE — TRAY CATH W/ 16FR CATH URIN M CTRL FIT OUTLT TB F STATLOK

## (undated) DEVICE — SUTURE MONOCRYL + SZ 3-0 L27IN ABSRB UD PS1 L24MM 3/8 CIR REV MCP936H

## (undated) DEVICE — DRAPE EXT W89XL128IN STD SMS POLYPR SFT ABSRB REINF CIR E

## (undated) DEVICE — DRESSING ANTIMIC FOAM MEPILEX BORD POSTOP AG PROD SZ 4X12 IN

## (undated) DEVICE — SOLUTION IV 50ML 0.9% SOD CHL

## (undated) DEVICE — HANDLE SUCT REG CAP FLANGETIP SMOOTH YANK

## (undated) DEVICE — SUTURE MCRYL SZ 3-0 L27IN ABSRB UD L24MM PS-1 3/8 CIR PRIM Y936H

## (undated) DEVICE — MANIFOLD SUCT 4 PRT 2 CANSTR FLTR DISP NEPTUNE 2

## (undated) DEVICE — SOLUTION PULSED LAVAGE XPERIENCE 500ML NO-RINSE

## (undated) DEVICE — APPLICATOR MEDICATED 26 CC SOLUTION HI LT ORNG CHLORAPREP

## (undated) DEVICE — SPONGE GZ W4XL4IN COT 12 PLY TYP VII WVN C FLD DSGN STERILE